# Patient Record
Sex: FEMALE | Race: WHITE | Employment: FULL TIME | URBAN - METROPOLITAN AREA
[De-identification: names, ages, dates, MRNs, and addresses within clinical notes are randomized per-mention and may not be internally consistent; named-entity substitution may affect disease eponyms.]

---

## 2019-08-14 ENCOUNTER — HOSPITAL ENCOUNTER (OUTPATIENT)
Dept: PHYSICAL THERAPY | Age: 49
Discharge: HOME | End: 2019-08-14
Payer: COMMERCIAL

## 2019-08-14 PROCEDURE — 97161 PT EVAL LOW COMPLEX 20 MIN: CPT | Performed by: PHYSICAL THERAPIST

## 2019-08-14 PROCEDURE — 97110 THERAPEUTIC EXERCISES: CPT | Performed by: PHYSICAL THERAPIST

## 2019-08-14 PROCEDURE — 97530 THERAPEUTIC ACTIVITIES: CPT | Performed by: PHYSICAL THERAPIST

## 2019-08-14 PROCEDURE — 97140 MANUAL THERAPY 1/> REGIONS: CPT | Performed by: PHYSICAL THERAPIST

## 2019-08-14 NOTE — PROGRESS NOTES
In [address] [place] [address] Square  [address] [place] [address], [address] [place] [address], [address] [place] [address] 434,[address] 300  [phone] fax      Plan of Care/ Statement of Necessity for Physical Therapy Services    Patient name: Lazarus Meléndez Start of Care: 2019   Referral source: Garett Saenz* : 1970    Medical Diagnosis: Pain in right knee [M25.561]  Payor: [place] / Plan: [address] PT / Product Type: Commerical /  Onset Date:2019    Treatment Diagnosis: R knee pain   Prior Hospitalization: see medical history Provider#: [id number]   Medications: Verified on Patient summary List    Comorbidities: Patient reports: asthma, back pain, BMI >30, headaches, prior surgery, stroke or TIA. Prior Level of Function: Patient reports she was walking 2.5 miles/day 5x/week and doing Weight Watchers, lost 30lbs prior to surgery. The Plan of Care and following information is based on the information from the initial evaluation. Assessment/ key information: Patient is a pleasant middle-aged older adult female with sub-acute onset of R knee pain following arthroscopic meniscectomy following prolonged pain and discomfort. Objective measures and special testing identify expected levels of pain, decreased range of motion and localized edema. Treatment will consist of strengthening and return to functional activities with discussion of lifestyle factors that affect pain and recovery/healing time.    Evaluation Complexity History MEDIUM  Complexity : 1-2 comorbidities / personal factors will impact the outcome/ POC ; Examination LOW Complexity : 1-2 Standardized tests and measures addressing body structure, function, activity limitation and / or participation in recreation  ;Presentation LOW Complexity : Stable, uncomplicated  ;Clinical Decision Making MEDIUM Complexity : FOTO score of 26-74  Overall Complexity Rating: MEDIUM  Problem List: pain affecting function, decrease ROM, decrease strength, edema affecting function, impaired gait/ balance, decrease ADL/ functional abilitiies, decrease activity tolerance, decrease flexibility/ joint mobility and decrease transfer abilities   Treatment Plan may include any combination of the following: Therapeutic exercise, Therapeutic activities, Neuromuscular re-education, Physical agent/modality, Gait/balance training, Manual therapy, Patient education, Self Care training, Functional mobility training, Stair training and Other: home exercise program  Patient / Family readiness to learn indicated by: asking questions, trying to perform skills and interest  Persons(s) to be included in education: patient (P)  Barriers to Learning/Limitations: None  Patient Goal (s): \"I want to regain stability and flexibility of the R knee, get back to walking. \"  Patient Self Reported Health Status: good  Rehabilitation Potential: good  Short Term Goals: To be accomplished in 4 weeks:  1. Patient will decrease pain during aggravating activities by 2 points on Verbal Analog Scale (Eval: 5/10) to increase participation on Activities of Daily Living such as bending and squatting. 2. Patient will demonstrate increased strength by ½ grade on MMT (3+/5) in B LEs to improve functional participation in such tasks as standing and sit/stand transfers. 3. Patient will have little to no difficulty ascending/descending stairs (eval: too low functioning to test)  Long Term Goals: To be accomplished in 10 weeks:  1. Patient will decrease pain during aggravating activities by 4 points on Verbal Analog Scale (Eval: 5/10) to increase participation on Activities of Daily Living such as, squatting, kneeling, lifting light to moderate objects as required by job. 2. Patient will demonstrate increased strength by 1 grade on MMT in B LEs (Eval: 3+/5) to improve functional participation in such tasks as standing and sitting for greater than 30 min.    3. Patient will achieve predicted FOTO scores (59, eval 33) to demonstrate decreased functional impairment to facilitate improved participation in activities of daily living, improve overall quality of life    Frequency / Duration: Patient to be seen 2-3 times per week for 10 weeks for up to 30 visits. Patient/ Caregiver education and instruction: Diagnosis, prognosis, self care, exercises and other home exercise program   [x]  Plan of care has been reviewed with DAYNE Colon, PT 8/14/2019 9:20 AM  ________________________________________________________________________    I certify that the above Therapy Services are being furnished while the patient is under my care. I agree with the treatment plan and certify that this therapy is necessary.     Physician's Signature:____________Date:_________TIME:________    [employer], Date and Time must be completed for valid certification **      Please sign and return to In [address] [place] [address] Drive Square  [address] [place] [address], [address] [place] [address], [id number] 434,[facility] 300 [phone] fax

## 2019-08-14 NOTE — PROGRESS NOTES
PT DAILY TREATMENT NOTE/KNEE EVAL 10-18    Patient Name: Urbano Choe  Date:2019  : 1970  [x]  Patient  Verified  Payor: Ashley Keep / Plan: [facility]  CAPITATED PT / Product Type: Commerical /    In time:9:12A  Out time:9:57A  Total Treatment Time (min): 42min  Visit #: 1 of 30    Medicare/BCBS Only   Total Timed Codes (min):   1:1 Treatment Time:       Treatment Area: Pain in right knee [M25.561]    SUBJECTIVE  Pain Level (0-10 scale): 4/10  []constant []intermittent [x]improving []worsening []no change since onset    Any medication changes, allergies to medications, adverse drug reactions, diagnosis change, or new procedure performed?: [x] No    [] Yes (see summary sheet for update)  Subjective functional status/changes:     PLOF: Was walking 2.5 miles/day 5x/week and doing Weight Watchers, lost 30lbs prior to surgery. Limitations to PLOF: Pain  Mechanism of Injury: Insidous - thinks she may have tripped while walking at a football game  Current symptoms/Complaints: Aggravating factors: 1) Straightening the knee causes 5/10, 2) Lifting leg to put socks/shoes on causes 5-6/10 pain; Eases: 1) Chiropractor for about 2 weeks, 2) Rest  Previous Treatment/Compliance: did have physical therapy  PMHx/Surgical Hx: Patient reports: asthma, back pain, headaches, prior surgery, stroke, or TIA  Work Hx: Works full time as an . Living Situation: Lives in one story home with  with 3 SARIAH and bilateral handrails. Pt Goals: \"I want to regain stability and flexibility of the R knee, get back to walking. \"  Barriers: []pain []financial []time []transportation []other  Motivation: High  Substance use: []Alcohol []Tobacco []other:   FABQ Score: []low [x]elevate  Cognition: A & O x 4    Other:    OBJECTIVE/EXAMINATION  Domestic Life: see above  Activity/Recreational Limitations: none  Mobility: See gait assessment below  Self Care: I with all ADLs    15 min [x]Eval                  []Re-Eval 12 min Therapeutic Exercise:  [x] See flow sheet :   Rationale: increase ROM and increase strength to improve the patients ability to A/ROM and decrease pain with movement. 10 min Therapeutic Activity:  [x]  See flow sheet :   Rationale: increase strength, improve coordination and improve balance  to improve the patients ability to Tolerate basic ADLs and job-related tasks without pain. 10 min Manual Therapy:  Grade 2-3 mobs to patella-femoral joint and tibia-femoral joint. Rationale: decrease pain, increase ROM and increase tissue extensibility to A/ROM and decrease pain with movement. With   [x] TE   [x] TA   [] neuro   [] other: Patient Education: [x] Review HEP    [x] Progressed/Changed HEP based on:   [x] positioning   [] body mechanics   [] transfers   [] heat/ice application    [] other:      Other Objective/Functional Measures: see body chart for vitals and sit/stand test results.     Physical Therapy Evaluation - Knee    Posture: [] Varus    [x] Valgus    [] Recurvatum        [] Tibial Torsion    [] Foot Supination    [x] Foot Pronation    Describe:    Gait:  [] Normal    [x] Abnormal    [] Antalgic    [] NWB    Device:    Describe: R knee does not reach full extension during stance phase, decreased step length    ROM / Strength  [] Unable to assess                  AROM                      PROM                   Strength (1-5)    Left Right Left Right Left Right   Hip Flexion 90 85   3 3-    Extension 5 10        Abduction          Adduction         Knee Flexion 140 120   3+ 3    Extension +10 0   3+ 3   Ankle Plantarflexion 60 60   4- 3+    Dorsiflexion 10 10   3+ 3       Flexibility: [] Unable to assess at this time  Hamstrings:    (L) Tightness= [] WNL   [] Min   [] Mod   [] Severe    (R) Tightness= [] WNL   [] Min   [x] Mod   [] Severe  Quadriceps:    (L) Tightness= [] WNL   [] Min   [] Mod   [] Severe    (R) Tightness= [] WNL   [] Min   [x] Mod   [] Severe  Gastroc:      (L) Tightness= [] WNL   [] Min   [] Mod   [] Severe    (R) Tightness= [] WNL   [] Min   [] Mod   [] Severe  Other:    Palpation:   Neg/Pos  Neg/Pos  Neg/Pos   Joint Line pos Quad tendon  Patellar ligament    Patella pos Fibular head  Pes Anserinus    Tibial tubercle  Hamstring tendons  Infrapatellar fat pad      Optional Tests:  Patellar Positioning (Static)   [x]L [x]R Normal []L []R Lateral   []L []R Remedios Rook      []L []R Medial   []L []R [place]    Patellar Tracking   []L []R Glide (Lat)   [x]L [x]R Tilt (Lat)     []L []R Glide (Med)  []L []R Tilt (Med)      []L []R Tile (Inf)     Patellar Mobility   [x]L []R Hypermobile []L [x]R Hypomobile         Girth Measurements: swelling noted empirically around R knee joint line    Cm at  Cm above joint line   Cm at   Cm below joint line  Cm at joint line   Left        Right             Other tests/comments:  Knee joint specific Special testing not performed second to presence of recent knee surgery. Pain Level (0-10 scale) post treatment: 2-3/10    ASSESSMENT/Changes in Function: Patient is a pleasant middle-aged older adult female with sub-acute onset of R knee pain following arthroscopic meniscectomy following prolonged pain and discomfort. Objective measures and special testing identify expected levels of pain, decreased range of motion and localized edema. Treatment will consist of strengthening and return to functional activities with discussion of lifestyle factors that affect pain and recovery/healing time. Patient will continue to benefit from skilled PT services to modify and progress therapeutic interventions, address functional mobility deficits, address ROM deficits, address strength deficits, analyze and address soft tissue restrictions, analyze and cue movement patterns, analyze and modify body mechanics/ergonomics and assess and modify postural abnormalities to attain remaining goals.      [x]  See Plan of Care  []  See progress note/recertification  []  See Discharge Summary         Progress towards goals / Updated goals:  Short Term Goals: To be accomplished in 4 weeks:  1. Patient will decrease pain during aggravating activities by 2 points on Verbal Analog Scale (Eval: 5/10) to increase participation on Activities of Daily Living such as bending and squatting. 2. Patient will demonstrate increased strength by ½ grade on MMT (3+/5) in B LEs to improve functional participation in such tasks as standing and sit/stand transfers. 3. Patient will have little to no difficulty ascending/descending stairs (eval: too low functioning to test)  Long Term Goals: To be accomplished in 10 weeks:  1. Patient will decrease pain during aggravating activities by 4 points on Verbal Analog Scale (Eval: 5/10) to increase participation on Activities of Daily Living such as, squatting, kneeling, lifting light to moderate objects as required by job. 2. Patient will demonstrate increased strength by 1 grade on MMT in B LEs (Eval: 3+/5) to improve functional participation in such tasks as standing and sitting for greater than 30 min.    3. Patient will achieve predicted FOTO scores (59, eval 33) to demonstrate decreased functional impairment to facilitate improved participation in activities of daily living, improve overall quality of life    PLAN  [x]  Upgrade activities as tolerated     []  Continue plan of care  []  Update interventions per flow sheet       []  Discharge due to:_  []  Other:_      Gregoria Colon, PT 8/14/2019  9:20 AM

## 2019-08-20 ENCOUNTER — HOSPITAL ENCOUNTER (OUTPATIENT)
Dept: PHYSICAL THERAPY | Age: 49
Discharge: HOME | End: 2019-08-20
Payer: COMMERCIAL

## 2019-08-20 PROCEDURE — 97140 MANUAL THERAPY 1/> REGIONS: CPT

## 2019-08-20 PROCEDURE — 97110 THERAPEUTIC EXERCISES: CPT

## 2019-08-20 PROCEDURE — 97016 VASOPNEUMATIC DEVICE THERAPY: CPT

## 2019-08-20 NOTE — PROGRESS NOTES
PT DAILY TREATMENT NOTE 10-18    Patient Name: Chayo Alvarado  Date:2019  : 1970  [x]  Patient  Verified  Payor: Shelley [place]año [facility] / Product Type: Commerical /    In time:7:30  Out time: 8:16  Total Treatment Time (min): 46  Visit #: 2 of 30     Medicare/BCBS Only   Total Timed Codes (min):   1:1 Treatment Time:         Treatment Area: Pain in right knee [M25.561]    SUBJECTIVE  Pain Level (0-10 scale): 3  Any medication changes, allergies to medications, adverse drug reactions, diagnosis change, or new procedure performed?: [x] No    [] Yes (see summary sheet for update)  Subjective functional status/changes:   [] No changes reported  \"  Achy. \"  Pain  Med  Makes  Me  Feel nauseous. \"    OBJECTIVE    Modality rationale: decrease edema, decrease inflammation, decrease pain and increase tissue extensibility to improve the patients ability to perform ADL   Min Type Additional Details    [] Estim:  []Unatt       []IFC  []Premod                        []Other:  []w/ice   []w/heat  Position:  Location:    [] Estim: []Att    []TENS instruct  []NMES                    []Other:  []w/US   []w/ice   []w/heat  Position:  Location:    []  Traction: [] Cervical       []Lumbar                       [] Prone          []Supine                       []Intermittent   []Continuous Lbs:  [] before manual  [] after manual    []  Ultrasound: []Continuous   [] Pulsed                           []1MHz   []3MHz W/cm2:  Location:    []  Iontophoresis with dexamethasone         Location: [] Take home patch   [] In clinic    []  Ice     []  heat  []  Ice massage  []  Laser   []  Anodyne Position:  Location:    []  Laser with stim  []  Other:  Position:  Location:   10 [x]  Vasopneumatic Device Pressure:       [x] lo [] med [] hi   Temperature: [] lo [] med [] hi   [x] Skin assessment post-treatment:  [x]intact []redness- no adverse reaction    []redness - adverse reaction:      min []Eval []Re-Eval       28 min Therapeutic Exercise:  [] See flow sheet :   Rationale: increase ROM and increase strength to improve the patients ability to perform ADL      min Therapeutic Activity:  []  See flow sheet :   Rationale:   to improve the patients ability to       min Neuromuscular Re-education:  []  See flow sheet :   Rationale:   to improve the patients ability to     8 min Manual Therapy:  Patella mob  Passive  Stretch knee F/ext   Rationale: decrease pain, increase ROM, increase tissue extensibility and decrease edema  to perform ADL     min Gait Training:  ___ feet with ___ device on level surfaces with ___ level of assist   Rationale: With   [x] TE   [] TA   [] neuro   [] other: Patient Education: [x] Review HEP    [] Progressed/Changed HEP based on:   [] positioning   [] body mechanics   [] transfers   [] heat/ice application    [] other:      Other Objective/Functional Measures:      Pain Level (0-10 scale) post treatment: 1    ASSESSMENT/Changes in Function: tight  End  Feel knee  Ext. Pt  Experienced  Minimal  Pain with there ex. Patient will continue to benefit from skilled PT services to address functional mobility deficits, address ROM deficits, address strength deficits, analyze and address soft tissue restrictions, analyze and cue movement patterns and instruct in home and community integration to attain remaining goals. [x]  See Plan of Care  []  See progress note/recertification  []  See Discharge Summary         Progress towards goals / Updated goals:  Short Term Goals: To be accomplished in 4 weeks:  1. Patient will decrease pain during aggravating activities by 2 points on Verbal Analog Scale (Eval: 5/10) to increase participation on Activities of Daily Living such as bending and squatting. progressing 8/20/19  2.  Patient will demonstrate increased strength by ½ grade on MMT (3+/5) in B LEs to improve functional participation in such tasks as standing and sit/stand transfers. 3. Patient will have little to no difficulty ascending/descending stairs (eval: too low functioning to test)  Long Term Goals: To be accomplished in 10 weeks:  1. Patient will decrease pain during aggravating activities by 4 points on Verbal Analog Scale (Eval: 5/10) to increase participation on Activities of Daily Living such as, squatting, kneeling, lifting light to moderate objects as required by job. 2. Patient will demonstrate increased strength by 1 grade on MMT in B LEs (Eval: 3+/5) to improve functional participation in such tasks as standing and sitting for greater than 30 min.    3. Patient will achieve predicted FOTO scores (59, eval 33) to demonstrate decreased functional impairment to facilitate improved participation in activities of daily living, improve overall quality of life    PLAN  []  Upgrade activities as tolerated     [x]  Continue plan of care  []  Update interventions per flow sheet       []  Discharge due to:_  []  Other:_      Halina Travis PTA 8/20/2019  7:44 AM    Future Appointments   Date Time Provider Edith Jordan   8/22/2019  5:30 PM Junior Mott PTA [facility]   8/26/2019  7:30 AM Junior Mott PTA [facility]   8/28/2019  7:30 AM Gurdeep Borrero PT [facility]   9/3/2019  7:30 AM Gurdeep Borrero PT [facility]   9/5/2019  8:00 AM Gurdeep Borrero PT [facility]

## 2019-08-22 ENCOUNTER — HOSPITAL ENCOUNTER (OUTPATIENT)
Dept: PHYSICAL THERAPY | Age: 49
Discharge: HOME | End: 2019-08-22
Payer: COMMERCIAL

## 2019-08-22 PROCEDURE — 97016 VASOPNEUMATIC DEVICE THERAPY: CPT

## 2019-08-22 PROCEDURE — 97110 THERAPEUTIC EXERCISES: CPT

## 2019-08-22 NOTE — PROGRESS NOTES
PT DAILY TREATMENT NOTE 10-18    Patient Name: Urbano Choe  Date:2019  : 1970  [x]  Patient  Verified  Payor: Ashley Keep / [facility]: [facility]  [facility] / Product Type: Commerical /    In time:5:29  Out time:6:30  Total Treatment Time (min): 55  Visit #:   3 of 30    Medicare/BCBS Only   Total Timed Codes (min):   1:1 Treatment Time:         Treatment Area: Pain in right knee [M25.561]    SUBJECTIVE  Pain Level (0-10 scale): 3  Any medication changes, allergies to medications, adverse drug reactions, diagnosis change, or new procedure performed?: [x] No    [] Yes (see summary sheet for update)  Subjective functional status/changes:   [] No changes reported  \"Still some pain. \"    OBJECTIVE    Modality rationale: decrease edema, decrease inflammation, decrease pain and increase tissue extensibility to improve the patients ability to perform ADL    Min Type Additional Details    [] Estim:  []Unatt       []IFC  []Premod                        []Other:  []w/ice   []w/heat  Position:  Location:    [] Estim: []Att    []TENS instruct  []NMES                    []Other:  []w/US   []w/ice   []w/heat  Position:  Location:    []  Traction: [] Cervical       []Lumbar                       [] Prone          []Supine                       []Intermittent   []Continuous Lbs:  [] before manual  [] after manual    []  Ultrasound: []Continuous   [] Pulsed                           []1MHz   []3MHz W/cm2:  Location:    []  Iontophoresis with dexamethasone         Location: [] Take home patch   [] In clinic    []  Ice     []  heat  []  Ice massage  []  Laser   []  Anodyne Position:  Location:    []  Laser with stim  []  Other:  Position:  Location:   15 []  Vasopneumatic Device Pressure:       [] lo [] med [] hi   Temperature: [] lo [] med [] hi   [x] Skin assessment post-treatment:  [x]intact []redness- no adverse reaction    []redness - adverse reaction:      min []Eval                  []Re-Eval       35 min Therapeutic Exercise:  [] See flow sheet :   Rationale: increase ROM and increase strength to improve the patients ability to perform ADL      min Therapeutic Activity:  []  See flow sheet :   Rationale: increase ROM and increase strength  to improve the patients ability to perform ADL       min Neuromuscular Re-education:  []  See flow sheet :   Rationale:   to improve the patients ability to     5 min Manual Therapy:  Patella  Mob/gentle overpressure ext   Rationale: decrease pain, increase ROM, increase tissue extensibility and decrease edema  to perform ADL     min Gait Training:  ___ feet with ___ device on level surfaces with ___ level of assist   Rationale: With   [x] TE   [] TA   [] neuro   [] other: Patient Education: [x] Review HEP    [] Progressed/Changed HEP based on:   [] positioning   [] body mechanics   [] transfers   [] heat/ice application    [] other:      Other Objective/Functional Measures: Added  Leg  Press/steps/squats    Pain Level (0-10 scale) post treatment: 2    ASSESSMENT/Changes in Function:  Responded  Fairly  Well  To each there ex. Tight end feel  And  pain  With knee ext. Patient will continue to benefit from skilled PT services to address functional mobility deficits, address ROM deficits, address strength deficits, analyze and address soft tissue restrictions and analyze and cue movement patterns to attain remaining goals. [x]  See Plan of Care  []  See progress note/recertification  []  See Discharge Summary         Progress towards goals / Updated goals:  Short Term Goals: To be accomplished in 4 weeks:  1. Patient will decrease pain during aggravating activities by 2 points on Verbal Analog Scale (Eval: 5/10) to increase participation on Activities of Daily Living such as bending and squatting.  progressing 8/20/19  2.  Patient will demonstrate increased strength by ½ grade on MMT (3+/5) in B LEs to improve functional participation in such tasks as standing and sit/stand transfers. 3. Patient will have little to no difficulty ascending/descending stairs (eval: too low functioning to test)  Long Term Goals: To be accomplished in 10 weeks:  1. Patient will decrease pain during aggravating activities by 4 points on Verbal Analog Scale (Eval: 5/10) to increase participation on Activities of Daily Living such as, squatting, kneeling, lifting light to moderate objects as required by job. 2. Patient will demonstrate increased strength by 1 grade on MMT in B LEs (Eval: 3+/5) to improve functional participation in such tasks as standing and sitting for greater than 30 min.    3. Patient will achieve predicted FOTO scores (59, eval 33) to demonstrate decreased functional impairment to facilitate improved participation in activities of daily living, improve overall quality of life  PLAN  []  Upgrade activities as tolerated     [x]  Continue plan of care  []  Update interventions per flow sheet       []  Discharge due to:_  []  Other:_      Tj Lieberman PTA 8/22/2019  6:05 PM    Future Appointments   Date Time Provider Edith Jordan   8/26/2019  7:30 AM Tami Davison PTA [facility]   8/28/2019  7:30 AM Lorin Mtz PT [facility]   9/3/2019  7:30 AM Lorin Mtz PT [facility]   9/5/2019  8:00 AM Lorin Mtz PT [facility]

## 2019-08-26 ENCOUNTER — HOSPITAL ENCOUNTER (OUTPATIENT)
Dept: PHYSICAL THERAPY | Age: 49
Discharge: HOME | End: 2019-08-26
Payer: COMMERCIAL

## 2019-08-26 PROCEDURE — 97140 MANUAL THERAPY 1/> REGIONS: CPT

## 2019-08-26 PROCEDURE — 97016 VASOPNEUMATIC DEVICE THERAPY: CPT

## 2019-08-26 PROCEDURE — 97110 THERAPEUTIC EXERCISES: CPT

## 2019-08-26 NOTE — PROGRESS NOTES
PT DAILY TREATMENT NOTE 10-18    Patient Name: Pérez Klein  Date:2019  : 1970  [x]  Patient  Verified  Payor: Lashonda Proctor / [facility] / Product Type: Commerical /    In time:7:31  Out time:8:277  Total Treatment Time (min): 51  Visit #: 4 of 30    Medicare/BCBS Only   Total Timed Codes (min):   1:1 Treatment Time:         Treatment Area: Pain in right knee [M25.561]    SUBJECTIVE  Pain Level (0-10 scale): 2  Any medication changes, allergies to medications, adverse drug reactions, diagnosis change, or new procedure performed?: [x] No    [] Yes (see summary sheet for update)  Subjective functional status/changes:   [] No changes reported  \"Mainly  Stiffness. \"    OBJECTIVE    Modality rationale: decrease edema, decrease inflammation, decrease pain and increase tissue extensibility to improve the patients ability to perform ADL    Min Type Additional Details    [] Estim:  []Unatt       []IFC  []Premod                        []Other:  []w/ice   []w/heat  Position:  Location:    [] Estim: []Att    []TENS instruct  []NMES                    []Other:  []w/US   []w/ice   []w/heat  Position:  Location:    []  Traction: [] Cervical       []Lumbar                       [] Prone          []Supine                       []Intermittent   []Continuous Lbs:  [] before manual  [] after manual    []  Ultrasound: []Continuous   [] Pulsed                           []1MHz   []3MHz W/cm2:  Location:    []  Iontophoresis with dexamethasone         Location: [] Take home patch   [] In clinic    []  Ice     []  heat  []  Ice massage  []  Laser   []  Anodyne Position:  Location:    []  Laser with stim  []  Other:  Position:  Location:   10 [x]  Vasopneumatic Device Pressure:       [x] lo [] med [] hi   Temperature: [] lo [] med [] hi   [x] Skin assessment post-treatment:  [x]intact []redness- no adverse reaction    []redness - adverse reaction:      min []Eval                  []Re-Eval       33 min Therapeutic Exercise:  [x] See flow sheet :   Rationale: increase ROM and increase strength to improve the patients ability to perform ADL      min Therapeutic Activity:  []  See flow sheet :   Rationale: increase ROM and increase strength  to improve the patients ability to perform ADL      min Neuromuscular Re-education:  []  See flow sheet :   Rationale:   to improve the patients ability to     8 min Manual Therapy:  Patella  Mob/passive  Stretch  Knee F/E   Rationale: increase tissue extensibility to perform ADL     min Gait Training:  ___ feet with ___ device on level surfaces with ___ level of assist   Rationale: With   [x] TE   [] TA   [] neuro   [] other: Patient Education: [x] Review HEP    [] Progressed/Changed HEP based on:   [] positioning   [] body mechanics   [] transfers   [] heat/ice application    [] other:      Other Objective/Functional Measures:      Pain Level (0-10 scale) post treatment: no#  Given stated  achy    ASSESSMENT/Changes in Function: continues  With tight end feel with ext. TTP with medial  [place]  During manual. sligh tchallenged  With     Patient will continue to benefit from skilled PT services to address functional mobility deficits, address ROM deficits, address strength deficits, analyze and address soft tissue restrictions, analyze and cue movement patterns and instruct in home and community integration to attain remaining goals. [x]  See Plan of Care  []  See progress note/recertification  []  See Discharge Summary         Progress towards goals / Updated goals:  Short Term Goals: To be accomplished in 4 weeks:  1. Patient will decrease pain during aggravating activities by 2 points on Verbal Analog Scale (Eval: 5/10) to increase participation on Activities of Daily Living such as bending and squatting.  progressing 8/20/19  2.  Patient will demonstrate increased strength by ½ grade on MMT (3+/5) in B LEs to improve functional participation in such tasks as standing and sit/stand transfers. 3. Patient will have little to no difficulty ascending/descending stairs (eval: too low functioning to test)  Long Term Goals: To be accomplished in 10 weeks:  1. Patient will decrease pain during aggravating activities by 4 points on Verbal Analog Scale (Eval: 5/10) to increase participation on Activities of Daily Living such as, squatting, kneeling, lifting light to moderate objects as required by job. 2. Patient will demonstrate increased strength by 1 grade on MMT in B LEs (Eval: 3+/5) to improve functional participation in such tasks as standing and sitting for greater than 30 min.    3. Patient will achieve predicted FOTO scores (59, eval 33) to demonstrate decreased functional impairment to facilitate improved participation in activities of daily living, improve overall quality of life    PLAN  []  Upgrade activities as tolerated     [x]  Continue plan of care  []  Update interventions per flow sheet       []  Discharge due to:_  []  Other:_      Kaya Merino, PTA 8/26/2019  7:42 AM    Future Appointments   Date Time Provider Edith Jordan   8/28/2019  7:30 AM Kalina Castillo PT [facility]   9/3/2019  7:30 AM Kalina Castillo PT [facility]   9/5/2019  8:00 AM Kalina Castillo PT [facility]

## 2019-08-28 ENCOUNTER — HOSPITAL ENCOUNTER (OUTPATIENT)
Dept: PHYSICAL THERAPY | Age: 49
Discharge: HOME | End: 2019-08-28
Payer: COMMERCIAL

## 2019-08-28 PROCEDURE — 97530 THERAPEUTIC ACTIVITIES: CPT | Performed by: PHYSICAL THERAPIST

## 2019-08-28 PROCEDURE — 97110 THERAPEUTIC EXERCISES: CPT | Performed by: PHYSICAL THERAPIST

## 2019-08-28 PROCEDURE — 97112 NEUROMUSCULAR REEDUCATION: CPT | Performed by: PHYSICAL THERAPIST

## 2019-08-28 PROCEDURE — 97140 MANUAL THERAPY 1/> REGIONS: CPT | Performed by: PHYSICAL THERAPIST

## 2019-08-28 PROCEDURE — 97016 VASOPNEUMATIC DEVICE THERAPY: CPT | Performed by: PHYSICAL THERAPIST

## 2019-08-28 NOTE — PROGRESS NOTES
PT DAILY TREATMENT NOTE 10-18    Patient Name: Wing Steiner  Date:2019  : 1970  [x]  Patient  Verified  Payor: Yuliana Overall / Plan: [facility]  [employer] [facility] / Product Type: Commerical /    In time:7:31A  Out time:8:30A  Total Treatment Time (min): 59min  Visit #: 5 of 30    Medicare/BCBS Only   Total Timed Codes (min):   1:1 Treatment Time:         Treatment Area: Pain in right knee [M25.561]    SUBJECTIVE  Pain Level (0-10 scale): 5/10  Any medication changes, allergies to medications, adverse drug reactions, diagnosis change, or new procedure performed?: [x] No    [] Yes (see summary sheet for update)  Subjective functional status/changes:   [] No changes reported  Patient is progressing towards goals of increased activity tolerance but continues to struggle to get up and walk when been sitting for a while. A bit down emotionally because it still hurts. OBJECTIVE    Modality rationale: decrease edema and decrease inflammation to improve the patients ability to improve activity tolerance.     Min Type Additional Details    [] Estim:  []Unatt       []IFC  []Premod                        []Other:  []w/ice   []w/heat  Position:  Location:    [] Estim: []Att    []TENS instruct  []NMES                    []Other:  []w/US   []w/ice   []w/heat  Position:  Location:    []  Traction: [] Cervical       []Lumbar                       [] Prone          []Supine                       []Intermittent   []Continuous Lbs:  [] before manual  [] after manual    []  Ultrasound: []Continuous   [] Pulsed                           []1MHz   []3MHz W/cm2:  Location:    []  Iontophoresis with dexamethasone         Location: [] Take home patch   [] In clinic    []  Ice     []  heat  []  Ice massage  []  Laser   []  Anodyne Position:  Location:    []  Laser with stim  []  Other:  Position:  Location:   15 [x]  Vasopneumatic Device Pressure:       [] lo [x] med [] hi   Temperature: [x] lo [] med [] hi   [] Skin assessment post-treatment:  []intact []redness- no adverse reaction    []redness - adverse reaction:     10 min Therapeutic Exercise:  [x] See flow sheet :   Rationale: increase ROM and increase strength to improve the patients ability to A/ROM and decrease pain with movement. 15 min Therapeutic Activity:  [x]  See flow sheet :   Rationale: increase strength and improve coordination  to improve the patients ability to Tolerate basic ADLs and job-related tasks without pain. 10 min Neuromuscular Re-education:  [x]  See flow sheet :   Rationale: improve coordination, improve balance and increase proprioception  to improve the patients ability to improve ability to trust and weight bear on the R LE.     9 min Manual Therapy:  Grade 2-3 mobs to patella-femoral joint, tibia-femoral and tibia-fibular joint, with patient self-direction. Rationale: decrease pain, increase ROM and increase tissue extensibility to improve activity tolerance. With   [x] TE   [x] TA   [x] neuro   [] other: Patient Education: [x] Review HEP    [x] Progressed/Changed HEP based on:   [x] positioning   [] body mechanics   [] transfers   [] heat/ice application    [] other:      Other Objective/Functional Measures: A/ROM knee extension on R: 0 degs in hamstring stretch position. Pain Level (0-10 scale) post treatment: achy    ASSESSMENT/Changes in Function: Patient is progressing towards goals of increased activity tolerance. Problem-solved way to adjust environment in positive way at work and at home. Encouraged her to start walking the treadmill 5 min every night for exercise.      Patient will continue to benefit from skilled PT services to modify and progress therapeutic interventions, address functional mobility deficits, address ROM deficits, address strength deficits, analyze and address soft tissue restrictions, analyze and cue movement patterns, analyze and modify body mechanics/ergonomics and assess and modify postural abnormalities to attain remaining goals. [x]  See Plan of Care  []  See progress note/recertification  []  See Discharge Summary         Progress towards goals / Updated goals:  Short Term Goals: To be accomplished in 4 weeks:  1. Patient will decrease pain during aggravating activities by 2 points on Verbal Analog Scale (Eval: 5/10) to increase participation on Activities of Daily Living such as bending and squatting.  progressing 8/20/19, 2/10 on 8/28/19  2. Patient will demonstrate increased strength by ½ grade on MMT (3+/5) in B LEs to improve functional participation in such tasks as standing and sit/stand transfers. 4-/5 on 8/28/19  3. Patient will have little to no difficulty ascending/descending stairs (eval: too low functioning to test); some difficulty on 8/28/19  Long Term Goals: To be accomplished in 10 weeks:  1. Patient will decrease pain during aggravating activities by 4 points on Verbal Analog Scale (Eval: 5/10) to increase participation on Activities of Daily Living such as, squatting, kneeling, lifting light to moderate objects as required by job. 2. Patient will demonstrate increased strength by 1 grade on MMT in B LEs (Eval: 3+/5) to improve functional participation in such tasks as standing and sitting for greater than 30 min.    3. Patient will achieve predicted FOTO scores (59, eval 33) to demonstrate decreased functional impairment to facilitate improved participation in activities of daily living, improve overall quality of life    PLAN  [x]  Upgrade activities as tolerated     []  Continue plan of care  []  Update interventions per flow sheet       []  Discharge due to:_  []  Other:_      Abbey Navas PT 8/28/2019  8:22 AM    Future Appointments   Date Time Provider Edith Jordan   9/3/2019  7:30 AM Dmitri Montaño PT [facility]   9/5/2019  8:00 AM [facility]

## 2019-09-03 ENCOUNTER — APPOINTMENT (OUTPATIENT)
Dept: PHYSICAL THERAPY | Age: 49
End: 2019-09-03
Payer: COMMERCIAL

## 2019-09-05 ENCOUNTER — HOSPITAL ENCOUNTER (OUTPATIENT)
Dept: PHYSICAL THERAPY | Age: 49
Discharge: HOME | End: 2019-09-05
Payer: COMMERCIAL

## 2019-09-05 PROCEDURE — 97112 NEUROMUSCULAR REEDUCATION: CPT

## 2019-09-05 PROCEDURE — 97110 THERAPEUTIC EXERCISES: CPT

## 2019-09-05 PROCEDURE — 97016 VASOPNEUMATIC DEVICE THERAPY: CPT

## 2019-09-05 PROCEDURE — 97530 THERAPEUTIC ACTIVITIES: CPT

## 2019-09-05 PROCEDURE — 97032 APPL MODALITY 1+ESTIM EA 15: CPT

## 2019-09-05 NOTE — PROGRESS NOTES
PT DAILY TREATMENT NOTE 10-18    Patient Name: Sade Collado  Date:2019  : 1970  [x]  Patient  Verified  Payor: Antonia Ruiz / Plan: [address] PT / Product Type: Commerical /    In time:800  Out time:853  Total Treatment Time (min): 50  Visit #: 6 of 30    Medicare/BCBS Only   Total Timed Codes (min):   1:1 Treatment Time:         Treatment Area: Pain in right knee [M25.561]    SUBJECTIVE  Pain Level (0-10 scale): 2  Any medication changes, allergies to medications, adverse drug reactions, diagnosis change, or new procedure performed?: [x] No    [] Yes (see summary sheet for update)  Subjective functional status/changes:   [] No changes reported  \"it's about a 2 today. \"      OBJECTIVE    Modality rationale: decrease edema, decrease inflammation, decrease pain and increase tissue extensibility to improve the patients ability to aid with increase tolerance to ADLs and activities   Min Type Additional Details    [] Estim:  []Unatt       []IFC  []Premod                        []Other:  []w/ice   []w/heat  Position:  Location:   8 [x] Estim: [x]Att    []TENS instruct  []NMES                    [x]Other: LTO []w/US   []w/ice   []w/heat  Position:reclined  Location:right distal ITBand and lateral knee    []  Traction: [] Cervical       []Lumbar                       [] Prone          []Supine                       []Intermittent   []Continuous Lbs:  [] before manual  [] after manual    []  Ultrasound: []Continuous   [] Pulsed                           []1MHz   []3MHz W/cm2:  Location:    []  Iontophoresis with dexamethasone         Location: [] Take home patch   [] In clinic    []  Ice     []  heat  []  Ice massage  []  Laser   []  Anodyne Position:  Location:    []  Laser with stim  []  Other:  Position:  Location:   15 [x]  Vasopneumatic Device  Right knee Pressure:       [x] lo [] med [] hi   Temperature: [x] lo [] med [] hi   [] Skin assessment post-treatment:  []intact []redness- no adverse reaction    []redness - adverse reaction:       min []Eval                  []Re-Eval       10 min Therapeutic Exercise:  [] See flow sheet :   Rationale: increase ROM, increase strength and improve coordination to improve the patients ability to aid with increase tolerance to ADLs and activities    8 min Therapeutic Activity:  []  See flow sheet :   Rationale: increase ROM, increase strength and improve coordination  to improve the patients ability to aid with increase tolerance to ADLS and activities     9 min Neuromuscular Re-education:  []  See flow sheet :   Rationale: increase ROM, increase strength and improve coordination  to improve the patients ability to aid with increase tolerance to ADLs and activities     min Manual Therapy:     Rationale:  to      min Gait Training:  ___ feet with ___ device on level surfaces with ___ level of assist   Rationale: With   [x] TE   [x] TA   [] neuro   [] other: Patient Education: [x] Review HEP    [x] Progressed/Changed HEP based on:   [] positioning   [] body mechanics   [] transfers   [] heat/ice application    [x] other: LTO     Other Objective/Functional Measures: VC exercises and tech      Pain Level (0-10 scale) post treatment: <2    ASSESSMENT/Changes in Function: tolerated well. Point tender lateral knee distal ITBand    Patient will continue to benefit from skilled PT services to modify and progress therapeutic interventions, address functional mobility deficits, address ROM deficits, address strength deficits, analyze and address soft tissue restrictions, analyze and cue movement patterns, analyze and modify body mechanics/ergonomics, assess and modify postural abnormalities and instruct in home and community integration to attain remaining goals. [x]  See Plan of Care  []  See progress note/recertification  []  See Discharge Summary         Progress towards goals / Updated goals:   Short Term Goals: To be accomplished in 4 weeks:  1.  Patient will decrease pain during aggravating activities by 2 points on Verbal Analog Scale (Eval: 5/10) to increase participation on Activities of Daily Living such as bending and squatting.  progressing 8/20/19, 2/10 on 8/28/19   2 9/5/19  2. Patient will demonstrate increased strength by ½ grade on MMT (3+/5) in B LEs to improve functional participation in such tasks as standing and sit/stand transfers. 4-/5 on 8/28/19  3. Patient will have little to no difficulty ascending/descending stairs (eval: too low functioning to test); some difficulty on 8/28/19  Long Term Goals: To be accomplished in 10 weeks:  1. Patient will decrease pain during aggravating activities by 4 points on Verbal Analog Scale (Eval: 5/10) to increase participation on Activities of Daily Living such as, squatting, kneeling, lifting light to moderate objects as required by job. CURRENT  9/5/19  2. Patient will demonstrate increased strength by 1 grade on MMT in B LEs (Eval: 3+/5) to improve functional participation in such tasks as standing and sitting for greater than 30 min.    Patient will achieve predicted FOTO scores (59, eval 33) to demonstrate decreased functional impairment to facilitate improved participation in activities of daily living, improve overall quality of life    PLAN  [x]  Upgrade activities as tolerated     [x]  Continue plan of care  []  Update interventions per flow sheet       []  Discharge due to:_  []  Other:_      Lasha Lal PT 9/5/2019  8:03 AM    Future Appointments   Date Time Provider Edith Jordan   9/9/2019  8:00 AM Mehdi Hernandez PTA [facility]   9/11/2019  7:30 AM Edvin Gearing, PT [facility]   9/13/2019  7:30 AM Mehdi Hernandez PTA [facility]   9/17/2019  7:30 AM Edvin Gearing, PT [facility]   9/20/2019  7:30 AM Edvin Gearing, PT [facility]

## 2019-09-09 ENCOUNTER — APPOINTMENT (OUTPATIENT)
Dept: PHYSICAL THERAPY | Age: 49
End: 2019-09-09
Payer: COMMERCIAL

## 2019-09-11 ENCOUNTER — APPOINTMENT (OUTPATIENT)
Dept: PHYSICAL THERAPY | Age: 49
End: 2019-09-11
Payer: COMMERCIAL

## 2019-09-13 ENCOUNTER — HOSPITAL ENCOUNTER (OUTPATIENT)
Dept: PHYSICAL THERAPY | Age: 49
Discharge: HOME | End: 2019-09-13
Payer: COMMERCIAL

## 2019-09-13 PROCEDURE — 97032 APPL MODALITY 1+ESTIM EA 15: CPT

## 2019-09-13 PROCEDURE — 97016 VASOPNEUMATIC DEVICE THERAPY: CPT

## 2019-09-13 PROCEDURE — 97110 THERAPEUTIC EXERCISES: CPT

## 2019-09-13 NOTE — PROGRESS NOTES
PT DAILY TREATMENT NOTE 10-18    Patient Name: Haroldo Gracia  Date:2019  : 1970  [x]  Patient  Verified  Payor: Herbert Sit / Plan: [address] [place] [address] Rd PT / Product Type: Commerical /    In time: 7:34 Out time: 8:26  Total Treatment Time (min): 44  Visit #:7 of 30    Medicare/BCBS Only   Total Timed Codes (min):  1:1 Treatment Time:         Treatment Area: Pain in right knee [M25.561]    SUBJECTIVE  Pain Level (0-10 scale): 2  Any medication changes, allergies to medications, adverse drug reactions, diagnosis change, or new procedure performed?: [x] No    [] Yes (see summary sheet for update)  Subjective functional status/changes:   [] No changes reported  \"Its  About the same. \"    OBJECTIVE    Modality rationale: decrease edema, decrease inflammation, decrease pain and increase tissue extensibility to improve the patients ability to perform ADL   Min Type Additional Details    [] Estim:  []Unatt       []IFC  []Premod                        []Other:  []w/ice   []w/heat  Position:  Location:   8 [x] Estim: [x]Att    []TENS instruct  []NMES                    []Other: LTO []w/US   []w/ice   []w/heat  Position:  Location:    []  Traction: [] Cervical       []Lumbar                       [] Prone          []Supine                       []Intermittent   []Continuous Lbs:  [] before manual  [] after manual    []  Ultrasound: []Continuous   [] Pulsed                           []1MHz   []3MHz W/cm2:  Location:    []  Iontophoresis with dexamethasone         Location: [] Take home patch   [] In clinic    []  Ice     []  heat  []  Ice massage  []  Laser   []  Anodyne Position:  Location:    []  Laser with stim  []  Other:  Position:  Location:   10 [x]  Vasopneumatic Device Pressure:       [x] lo [] med [] hi   Temperature: [] lo [] med [] hi   [x] Skin assessment post-treatment:  [x]intact []redness- no adverse reaction    []redness - adverse reaction:      min []Eval                  []Re-Eval       26 min Therapeutic Exercise:  [x] See flow sheet :   Rationale: increase ROM and increase strength to improve the patients ability to perform ADL     min Therapeutic Activity:  []  See flow sheet :   Rationale: increase ROM and increase strength  to improve the patients ability to perform ADL       min Neuromuscular Re-education:  []  See flow sheet :   Rationale:   to improve the patients ability to      min Manual Therapy:     Rationale: decrease pain, increase ROM, increase tissue extensibility and decrease edema  to perform ADL     min Gait Training:  ___ feet with ___ device on level surfaces with ___ level of assist   Rationale: With   [x] TE   [] TA   [] neuro   [] other: Patient Education: [x] Review HEP    [] Progressed/Changed HEP based on:   [] positioning   [] body mechanics   [] transfers   [] heat/ice application    [] other:      Other Objective/Functional Measures:      Pain Level (0-10 scale) post treatment: 1    ASSESSMENT/Changes in Function: Tender at lateral  And  Medial  Knee. Experienced min increased pain with stand HR/TR. Overall fair  Response  To each there ex. Patient will continue to benefit from skilled PT services to address functional mobility deficits, address ROM deficits, address strength deficits, analyze and address soft tissue restrictions and analyze and cue movement patterns to attain remaining goals. [x]  See Plan of Care  []  See progress note/recertification  []  See Discharge Summary         Progress towards goals / Updated goals:  Short Term Goals: To be accomplished in 4 weeks:  1. Patient will decrease pain during aggravating activities by 2 points on Verbal Analog Scale (Eval: 5/10) to increase participation on Activities of Daily Living such as bending and squatting.  progressing 8/20/19, 2/10 on 8/28/19   2 9/5/19  2.  Patient will demonstrate increased strength by ½ grade on MMT (3+/5) in B LEs to improve functional participation in such tasks as standing and sit/stand transfers. 4-/5 on 8/28/19  3. Patient will have little to no difficulty ascending/descending stairs (eval: too low functioning to test); some difficulty on 8/28/19  Long Term Goals: To be accomplished in 10 weeks:  1. Patient will decrease pain during aggravating activities by 4 points on Verbal Analog Scale (Eval: 5/10) to increase participation on Activities of Daily Living such as, squatting, kneeling, lifting light to moderate objects as required by job. CURRENT  9/5/19  2. Patient will demonstrate increased strength by 1 grade on MMT in B LEs (Eval: 3+/5) to improve functional participation in such tasks as standing and sitting for greater than 30 min.    Patient will achieve predicted FOTO scores (59, eval 33) to demonstrate decreased functional impairment to facilitate improved participation in activities of daily living, improve overall quality of life    PLAN  []  Upgrade activities as tolerated     [x]  Continue plan of care  []  Update interventions per flow sheet       []  Discharge due to:_  [x]  Other:_  REASSESS GOALS for  MD NOTE NV    [address] [facility] 9/13/2019  7:41 AM    Future Appointments   Date Time Provider Edith Jordan   9/17/2019  7:30 AM Varsha Barron PT [facility]   9/20/2019  7:30 AM Varsha Barron PT [facility]

## 2019-09-17 ENCOUNTER — HOSPITAL ENCOUNTER (OUTPATIENT)
Dept: PHYSICAL THERAPY | Age: 49
Discharge: HOME | End: 2019-09-17
Payer: COMMERCIAL

## 2019-09-17 PROCEDURE — 97016 VASOPNEUMATIC DEVICE THERAPY: CPT

## 2019-09-17 PROCEDURE — 97032 APPL MODALITY 1+ESTIM EA 15: CPT

## 2019-09-17 PROCEDURE — 97110 THERAPEUTIC EXERCISES: CPT

## 2019-09-17 PROCEDURE — 97112 NEUROMUSCULAR REEDUCATION: CPT

## 2019-09-17 PROCEDURE — 97530 THERAPEUTIC ACTIVITIES: CPT

## 2019-09-17 NOTE — PROGRESS NOTES
PT DAILY TREATMENT NOTE 10-18    Patient Name: Yareli Villar  Date:2019  : 1970  [x]  Patient  Verified  Payor: Deandre Mention / Plan: [address] PT / Product Type: Commerical /    In time:728  Out time:832  Total Treatment Time (min): 61  Visit #: 8 of 30    Medicare/BCBS Only   Total Timed Codes (min):   1:1 Treatment Time:         Treatment Area: Pain in right knee [M25.561]    SUBJECTIVE  Pain Level (0-10 scale): 2  Any medication changes, allergies to medications, adverse drug reactions, diagnosis change, or new procedure performed?: [x] No    [] Yes (see summary sheet for update)  Subjective functional status/changes:   [] No changes reported  \" I saw the doctor last week and they gave me a paper to continue.  I go back in 1 month\"     OBJECTIVE    Modality rationale: decrease pain and increase tissue extensibility to improve the patients ability to tolerat   Min Type Additional Details    [] Estim:  []Unatt       []IFC  []Premod                        []Other:  []w/ice   []w/heat  Position:  Location:   8 [x] Estim: [x]Att    []TENS instruct  []NMES                    [x]Other: LTO []w/US   []w/ice   []w/heat  Position:reclined  Location:right lateral knee    []  Traction: [] Cervical       []Lumbar                       [] Prone          []Supine                       []Intermittent   []Continuous Lbs:  [] before manual  [] after manual    []  Ultrasound: []Continuous   [] Pulsed                           []1MHz   []3MHz W/cm2:  Location:    []  Iontophoresis with dexamethasone         Location: [] Take home patch   [] In clinic    []  Ice     []  heat  []  Ice massage  []  Laser   []  Anodyne Position:  Location:    []  Laser with stim  []  Other:  Position:  Location:   15 [x]  Vasopneumatic Device     Right knee Pressure:       [x] lo [] med [] hi   Temperature: [x] lo [] med [] hi   [] Skin assessment post-treatment:  []intact []redness- no adverse reaction    []redness - adverse reaction:       min []Eval                  []Re-Eval       15 min Therapeutic Exercise:  [x] See flow sheet :   Rationale: increase ROM, increase strength and improve coordination to improve the patients ability to tolerate ADLS and activities    10 min Therapeutic Activity:  [x]  See flow sheet :   Rationale: increase ROM, increase strength, improve coordination and improve balance  to improve the patients ability to tolerate ADLS and activities     13 min Neuromuscular Re-education:  [x]  See flow sheet :   Rationale: increase ROM, increase strength and improve coordination  to improve the patients ability to tolerate ADLs and activities      min Manual Therapy:     Rationale:  to      min Gait Training:  ___ feet with ___ device on level surfaces with ___ level of assist   Rationale: With   [] TE   [] TA   [] neuro   [] other: Patient Education: [x] Review HEP    [] Progressed/Changed HEP based on:   [] positioning   [] body mechanics   [] transfers   [] heat/ice application    [] other:      Other Objective/Functional Measures:  VC exercises and technique    Pain Level (0-10 scale) post treatment: 2    ASSESSMENT/Changes in Function: tolerated well. Patient will continue to benefit from skilled PT services to modify and progress therapeutic interventions, address ROM deficits, address strength deficits, analyze and address soft tissue restrictions, analyze and cue movement patterns, analyze and modify body mechanics/ergonomics, assess and modify postural abnormalities and instruct in home and community integration to attain remaining goals. [x]  See Plan of Care  [x]  See progress note/recertification  []  See Discharge Summary         Progress towards goals / Updated goals:   Short Term Goals: To be accomplished in 4 weeks:  1.  Patient will decrease pain during aggravating activities by 2 points on Verbal Analog Scale (Eval: 5/10) to increase participation on Activities of Daily Living such as bending and squatting. CURRENT pain with activities is 2-3 9/17/19  2. Patient will demonstrate increased strength by ½ grade on MMT (3+/5) in B LEs to improve functional participation in such tasks as standing and sit/stand transfers. 4-/5 on 8/28/19  3. Patient will have little to no difficulty ascending/descending stairs (eval: too low functioning to test);CURRENT still needs use of 1 rail with stairs overall improving (slowly) 9/17/19  Long Term Goals: To be accomplished in 10 weeks:  1. Patient will decrease pain during aggravating activities by 4 points on Verbal Analog Scale (Eval: 5/10) to increase participation on Activities of Daily Living such as, squatting, kneeling, lifting light to moderate objects as required by job.     CURRENT 2-3 9/17/19  2. Patient will demonstrate increased strength by 1 grade on MMT in B LEs (Eval: 3+/5) to improve functional participation in such tasks as standing and sitting for greater than 30 min.    3. Patient will achieve predicted FOTO scores (59, eval 33) to demonstrate decreased functional impairment to facilitate improved participation in activities of daily living, improve overall quality of life CURRENT        PLAN  [x]  Upgrade activities as tolerated     [x]  Continue plan of care  []  Update interventions per flow sheet       []  Discharge due to:_  [x]  Other:_  Send MD note  Stevenson Eaton PT 9/17/2019  7:37 AM    Future Appointments   Date Time Provider Edith Jordan   9/20/2019  7:30 AM Manjula Hernandez PT [facility]

## 2019-09-17 NOTE — PROGRESS NOTES
[facility] [address] [facility]  [address] [facility], [facility] [facility], [facility] [address],[place] [address]  [phone] fax    Physician Update  [x] Progress Note  [] Discharge Summary  Patient name: Mariza Bonilla Start of Care: 19   Referral source: Noel Knife* : 1970   Medical/Treatment Diagnosis: Pain in right knee [M25.561]  Payor: Lennox Perez / Plan: [address] PT / Product Type: Commerical /  Onset Date:19     Prior Hospitalization: see medical history Provider#: [id number]   Medications: Verified on Patient Summary List     Comorbidities: Patient reports: asthma, back pain, BMI >30, headaches, prior surgery, stroke or TIA. Prior Level of Function: Patient reports she was walking 2.5 miles/day 5x/week and doing Weight Watchers, lost 30lbs prior to surgery.     Visits from Start of Care: 8    Missed Visits: 1    Status at Evaluation/Last Progress Note: initial evaluation  Progress towards Goals: Pian average with activiites is 2-3. FOTO improved to 43. She has exercises for her HEP and reports compliance. Overall she is noting improvement however not as quickly as she would like to. Goals: to be achieved in 30 total treatments:  1. Patient will decrease pain during aggravating activities by 4 points on Verbal Analog Scale (Eval: 5/10) to increase participation on Activities of Daily Living such as, squatting, kneeling, lifting light to moderate objects as required by job.     CURRENT 2-3 19  2. Patient will demonstrate increased strength by 1 grade on MMT in B LEs (Eval: 3+/5) to improve functional participation in such tasks as standing and sitting for greater than 30 min.            3. Patient will achieve predicted FOTO scores (59, eval 33) to demonstrate decreased functional impairment to facilitate improved participation in activities of daily living, improve overall quality of life CURRENT 43   4 patient will tolerate TM 1/4 mile with no significant increase pain to aid with increase tolerance to ADLs and activities at home   CURRENT . 16 miles in 6 minutes  ASSESSMENT/RECOMMENDATIONS:  [x]Continue therapy per initial plan/protocol at a frequency of  2 x per week for 30 total treatments/ reassess in 30 days  []Continue therapy with the following recommended changes:_____________________      _____________________________________________________________________  []Discontinue therapy progressing towards or have reached established goals  []Discontinue therapy due to lack of appreciable progress towards goals  []Discontinue therapy due to lack of attendance or compliance  []Await Physician's recommendations/decisions regarding therapy  []Other:________________________________________________________________    Thank you for this referral. Anthony Salazar, PT 9/17/2019 8:02 AM  NOTE TO PHYSICIAN:  PLEASE COMPLETE THE ORDERS BELOW AND   FAX TO [facility]: [phone]  If you are unable to process this request in 24 hours please contact our office: [phone]    ? I have read the above report and request that my patient continue as recommended. ? I have read the above report and request that my patient continue therapy with the following changes/special instructions:_____________________________________  ? I have read the above report and request that my patient be discharged from therapy.     [de-identified] Signature:____________Date:_________TIME:________    [employer], Date and Time must be completed for valid certification **

## 2019-09-20 ENCOUNTER — HOSPITAL ENCOUNTER (OUTPATIENT)
Dept: PHYSICAL THERAPY | Age: 49
Discharge: HOME | End: 2019-09-20
Payer: COMMERCIAL

## 2019-09-20 PROCEDURE — 97110 THERAPEUTIC EXERCISES: CPT

## 2019-09-20 PROCEDURE — 97016 VASOPNEUMATIC DEVICE THERAPY: CPT

## 2019-09-20 PROCEDURE — 97112 NEUROMUSCULAR REEDUCATION: CPT

## 2019-09-20 PROCEDURE — 97530 THERAPEUTIC ACTIVITIES: CPT

## 2019-09-20 PROCEDURE — 97032 APPL MODALITY 1+ESTIM EA 15: CPT

## 2019-09-20 NOTE — PROGRESS NOTES
PT DAILY TREATMENT NOTE 10-18    Patient Name: Dax Jaimes  Date:2019  : 1970  [x]  Patient  Verified  Payor: Tong Augustine / Plan: [facility] [employer] [facility] / Product Type: Commerical /    In time:731  Out time:833  Total Treatment Time (min): 58  Visit #: 9 of 30    Medicare/BCBS Only   Total Timed Codes (min):          Treatment Area: Pain in right knee [M25.561]    SUBJECTIVE  Pain Level (0-10 scale): 2  Any medication changes, allergies to medications, adverse drug reactions, diagnosis change, or new procedure performed?: [x] No    [] Yes (see summary sheet for update)  Subjective functional status/changes:   [] No changes reported  \" It is still a little sore. The laser helps. \"    OBJECTIVE    Modality rationale: decrease inflammation, decrease pain and increase tissue extensibility to improve the patients ability to tolerate ADLS and activities   Min Type Additional Details    [] Estim:  []Unatt       []IFC  []Premod                        []Other:  []w/ice   []w/heat  Position:  Location:   8 [x] Estim: [x]Att    []TENS instruct  []NMES                    [x]Other: LTO  []w/US   []w/ice   []w/heat  Position:reclined  Location:;lateral > medial right knee    []  Traction: [] Cervical       []Lumbar                       [] Prone          []Supine                       []Intermittent   []Continuous Lbs:  [] before manual  [] after manual    []  Ultrasound: []Continuous   [] Pulsed                           []1MHz   []3MHz W/cm2:  Location:    []  Iontophoresis with dexamethasone         Location: [] Take home patch   [] In clinic    []  Ice     []  heat  []  Ice massage  []  Laser   []  Anodyne Position:  Location:    []  Laser with stim  []  Other:  Position:  Location:   15 [x]  Vasopneumatic Device  Right knee Pressure:       [] lo [x] med [] hi   Temperature: [x] lo [] med [] hi   [] Skin assessment post-treatment:  []intact []redness- no adverse reaction    []redness - adverse reaction: min []Eval                  []Re-Eval       15 min Therapeutic Exercise:  [x] See flow sheet :   Rationale: increase ROM, increase strength and improve coordination to improve the patients ability to tolerate ADLs and activities    10 min Therapeutic Activity:  [x]  See flow sheet :   Rationale: increase ROM, increase strength and improve coordination  to improve the patients ability to tolerate ADLS and activities     10 min Neuromuscular Re-education:  [x]  See flow sheet :   Rationale: increase ROM, increase strength, improve coordination, improve balance and increase proprioception  to improve the patients ability to tolerate ADLS and activities     min Manual Therapy:     Rationale:  to      min Gait Training:  ___ feet with ___ device on level surfaces with ___ level of assist   Rationale: With   [x] TE   [x] TA   [] neuro   [] other: Patient Education: [x] Review HEP    [x] Progressed/Changed HEP based on:   [] positioning   [] body mechanics   [] transfers   [] heat/ice application    [] other:      Other Objective/Functional Measures: VC exercises and tech. Pain Level (0-10 scale) post treatment: <2    ASSESSMENT/Changes in Function: tolerated well    Patient will continue to benefit from skilled PT services to modify and progress therapeutic interventions, address ROM deficits, address strength deficits, analyze and address soft tissue restrictions, analyze and cue movement patterns, analyze and modify body mechanics/ergonomics, assess and modify postural abnormalities and instruct in home and community integration to attain remaining goals. [x]  See Plan of Care  [x]  See progress note/recertification  []  See Discharge Summary         Progress towards goals / Updated goals:  Goals: to be achieved in 30 total treatments:  1.  Patient will decrease pain during aggravating activities by 4 points on Verbal Analog Scale (Eval: 5/10) to increase participation on Activities of Daily Living such as, squatting, kneeling, lifting light to moderate objects as required by job.     CURRENT 2-3 9/17/19 9/20/19  2. Patient will demonstrate increased strength by 1 grade on MMT in B LEs (Eval: 3+/5) to improve functional participation in such tasks as standing and sitting for greater than 30 min.   CURRENT ongoing 9/20/19          3. Patient will achieve predicted FOTO scores (59, eval 33) to demonstrate decreased functional impairment to facilitate improved participation in activities of daily living, improve overall quality of life CURRENT 43              4 patient will tolerate TM 1/4 mile with no significant increase pain to aid with increase tolerance to ADLs and activities at home              CURRENT . 16 miles in 6 minutes  , 7 minutes 9/20/19    PLAN  [x]  Upgrade activities as tolerated     [x]  Continue plan of care  []  Update interventions per flow sheet       []  Discharge due to:_  []  Other:_      Chan Kincaid, PT 9/20/2019  7:36 AM    Future Appointments   Date Time Provider Edith Jordan   9/25/2019  8:00 AM Faviola Sky PTA [facility]   9/30/2019  7:30 AM Faviola Sky PTA [facility]   10/2/2019  7:30 AM Jun Everett PT [facility]   10/4/2019  4:00 PM Faviola Sky PTA [facility]   10/9/2019  7:30 AM Rajni Haywood PTA [facility]   10/11/2019  4:00 PM Rajni Haywood PTA [facility]

## 2019-09-25 ENCOUNTER — HOSPITAL ENCOUNTER (OUTPATIENT)
Dept: PHYSICAL THERAPY | Age: 49
Discharge: HOME | End: 2019-09-25
Payer: COMMERCIAL

## 2019-09-25 PROCEDURE — 97016 VASOPNEUMATIC DEVICE THERAPY: CPT

## 2019-09-25 PROCEDURE — 97032 APPL MODALITY 1+ESTIM EA 15: CPT

## 2019-09-25 PROCEDURE — 97110 THERAPEUTIC EXERCISES: CPT

## 2019-09-25 NOTE — PROGRESS NOTES
PT DAILY TREATMENT NOTE 10-18    Patient Name: Ana Acevedo  Date:2019  : 1970  [x]  Patient  Verified  Payor: Amanda Cortes / Plan: [facility] [employer] / Product Type: Commerical /    In time 8:05  Out time: 8:59  Total Treatment Time (min): 47  Visit #: 10 of 30    Medicare/BCBS Only   Total Timed Codes (min):   1:1 Treatment Time:         Treatment Area: Pain in right knee [M25.561]    SUBJECTIVE  Pain Level (0-10 scale): 1-2  Any medication changes, allergies to medications, adverse drug reactions, diagnosis change, or new procedure performed?: [x] No    [] Yes (see summary sheet for update)  Subjective functional status/changes:   [] No changes reported  \"Still some pain. \"    OBJECTIVE    Modality rationale: decrease edema, decrease inflammation, decrease pain and increase tissue extensibility to improve the patients ability to perform ADL   Min Type Additional Details    [] Estim:  []Unatt       []IFC  []Premod                        []Other:  []w/ice   []w/heat  Position:  Location:   8 [x] Estim: [x]Att    []TENS instruct  []NMES                    [x]Other:LTO  []w/US   []w/ice   []w/heat  Position:long  sit  Location: righ t knee    []  Traction: [] Cervical       []Lumbar                       [] Prone          []Supine                       []Intermittent   []Continuous Lbs:  [] before manual  [] after manual    []  Ultrasound: []Continuous   [] Pulsed                           []1MHz   []3MHz W/cm2:  Location:    []  Iontophoresis with dexamethasone         Location: [] Take home patch   [] In clinic    []  Ice     []  heat  []  Ice massage  []  Laser   []  Anodyne Position:  Location:    []  Laser with stim  []  Other:  Position:  Location:   10 []  Vasopneumatic Device Pressure:       [x] lo [] med [] hi   Temperature: [] lo [] med [] hi   [x] Skin assessment post-treatment:  [x]intact []redness- no adverse reaction    []redness - adverse reaction:      min []Eval []Re-Eval       29 min Therapeutic Exercise:  [] See flow sheet :   Rationale:  to improve the patients ability to      min Therapeutic Activity:  []  See flow sheet :   Rationale:   to improve the patients ability to       min Neuromuscular Re-education:  []  See flow sheet :   Rationale:   to improve the patients ability to      min Manual Therapy:     Rationale: decrease pain, increase ROM, increase tissue extensibility and decrease edema  to perform ADL     min Gait Training:  ___ feet with ___ device on level surfaces with ___ level of assist   Rationale: With   [x] TE   [] TA   [] neuro   [] other: Patient Education: [x] Review HEP    [] Progressed/Changed HEP based on:   [] positioning   [] body mechanics   [] transfers   [] heat/ice application    [] other:      Other Objective/Functional Measures:      Pain Level (0-10 scale) post treatment: 1    ASSESSMENT/Changes in Function: ambulated  With antalgic  Gait  On TM. Fair response  To remaining  There ex. Patient will continue to benefit from skilled PT services to address functional mobility deficits, address ROM deficits, address strength deficits, analyze and address soft tissue restrictions and analyze and cue movement patterns to attain remaining goals. [x]  See Plan of Care  []  See progress note/recertification  []  See Discharge Summary         Progress towards goals / Updated goals:  Goals: to be achieved in 30 total treatments:  1. Patient will decrease pain during aggravating activities by 4 points on Verbal Analog Scale (Eval: 5/10) to increase participation on Activities of Daily Living such as, squatting, kneeling, lifting light to moderate objects as required by job.     CURRENT 2-3 9/17/19 9/20/19  2.  Patient will demonstrate increased strength by 1 grade on MMT in B LEs (Eval: 3+/5) to improve functional participation in such tasks as standing and sitting for greater than 30 min.   CURRENT ongoing 9/20/19          3. Patient will achieve predicted FOTO scores (59, eval 33) to demonstrate decreased functional impairment to facilitate improved participation in activities of daily living, improve overall quality of life CURRENT 43              4 patient will tolerate TM 1/4 mile with no significant increase pain to aid with increase tolerance to ADLs and activities at home              CURRENT . 16 miles in 6 minutes  , 7 minutes 9/20/19    PLAN  []  Upgrade activities as tolerated     []  Continue plan of care  []  Update interventions per flow sheet       []  Discharge due to:_  []  Other:_      Rajni Haywood PTA 9/25/2019  8:09 AM    Future Appointments   Date Time Provider Edith Jordan   9/30/2019  7:30 AM Mehdi Hernandez PTA [facility]   10/2/2019  7:30 AM Edvin Keene, PT [facility]   10/4/2019  4:00 PM Mehdi Hernandez PTA [facility]   10/9/2019  7:30 AM Rajni Haywood PTA [facility]   10/11/2019  4:00 PM Rajni Haywood PTA [facility]

## 2019-09-27 ENCOUNTER — APPOINTMENT (OUTPATIENT)
Dept: PHYSICAL THERAPY | Age: 49
End: 2019-09-27
Payer: COMMERCIAL

## 2019-09-30 ENCOUNTER — HOSPITAL ENCOUNTER (OUTPATIENT)
Dept: PHYSICAL THERAPY | Age: 49
Discharge: HOME | End: 2019-09-30
Payer: COMMERCIAL

## 2019-09-30 PROCEDURE — 97110 THERAPEUTIC EXERCISES: CPT

## 2019-09-30 PROCEDURE — 97016 VASOPNEUMATIC DEVICE THERAPY: CPT

## 2019-09-30 NOTE — PROGRESS NOTES
PT DAILY TREATMENT NOTE 10-18    Patient Name: Joan Clark  Date:2019  : 1970  [x]  Patient  Verified  Payor: Orlando Hart / Plan: [place] [employer]  [employer] [facility] / Product Type: Commerical /    In time: 7:30  Out time:8:14  Total Treatment Time (min): 44  Visit #: 11 of 30    Medicare/BCBS Only   Total Timed Codes (min):   1:1 Treatment Time:         Treatment Area: Pain in right knee [M25.561]    SUBJECTIVE  Pain Level (0-10 scale): 1  Any medication changes, allergies to medications, adverse drug reactions, diagnosis change, or new procedure performed?: [x] No    [] Yes (see summary sheet for update)  Subjective functional status/changes:   [] No changes reported  \"stiffness. \"    OBJECTIVE    Modality rationale: decrease edema, decrease inflammation, decrease pain and increase tissue extensibility to improve the patients ability to perform  ADL   Min Type Additional Details    [] Estim:  []Unatt       []IFC  []Premod                        []Other:  []w/ice   []w/heat  Position:  Location:    [] Estim: []Att    []TENS instruct  []NMES                    []Other:  []w/US   []w/ice   []w/heat  Position:  Location:    []  Traction: [] Cervical       []Lumbar                       [] Prone          []Supine                       []Intermittent   []Continuous Lbs:  [] before manual  [] after manual    []  Ultrasound: []Continuous   [] Pulsed                           []1MHz   []3MHz W/cm2:  Location:    []  Iontophoresis with dexamethasone         Location: [] Take home patch   [] In clinic    []  Ice     []  heat  []  Ice massage  []  Laser   []  Anodyne Position:  Location:    []  Laser with stim  []  Other:  Position:  Location:   10 [x]  Vasopneumatic Device Pressure:       [x] lo [] med [] hi   Temperature: [] lo [] med [] hi   [x] Skin assessment post-treatment:  [x]intact []redness- no adverse reaction    []redness - adverse reaction:      min []Eval                  []Re-Eval       34 min Therapeutic Exercise:  [x] See flow sheet :   Rationale: increase ROM and increase strength to improve the patients ability to perform ADL      min Therapeutic Activity:  []  See flow sheet :   Rationale:   to improve the patients ability to       min Neuromuscular Re-education:  []  See flow sheet :   Rationale:   to improve the patients ability to      min Manual Therapy:     Rationale: decrease pain, increase ROM, increase tissue extensibility and decrease edema  to perform ADL     min Gait Training:  ___ feet with ___ device on level surfaces with ___ level of assist   Rationale: With   [x] TE   [] TA   [] neuro   [] other: Patient Education: [x] Review HEP    [] Progressed/Changed HEP based on:   [] positioning   [] body mechanics   [] transfers   [] heat/ice application    [] other:      Other Objective/Functional Measures:  Increased  Time on TM  8 min    Pain Level (0-10 scale) post treatment: 0-1    ASSESSMENT/Changes in Function: Experienced  Pain at  Lateral aspect  Of  Knee  With quad  Sets. Patient will continue to benefit from skilled PT services to address functional mobility deficits, address ROM deficits, address strength deficits, analyze and address soft tissue restrictions and analyze and cue movement patterns to attain remaining goals. [x]  See Plan of Care  []  See progress note/recertification  []  See Discharge Summary         Progress towards goals / Updated goals:  Goals: to be achieved in 30 total treatments:  1. Patient will decrease pain during aggravating activities by 4 points on Verbal Analog Scale (Eval: 5/10) to increase participation on Activities of Daily Living such as, squatting, kneeling, lifting light to moderate objects as required by job.     CURRENT 2-3 9/17/19 9/20/19  2.  Patient will demonstrate increased strength by 1 grade on MMT in B LEs (Eval: 3+/5) to improve functional participation in such tasks as standing and sitting for greater than 30 min.   CURRENT ongoing 9/20/19          3. Patient will achieve predicted FOTO scores (59, eval 33) to demonstrate decreased functional impairment to facilitate improved participation in activities of daily living, improve overall quality of life CURRENT 43              4 patient will tolerate TM 1/4 mile with no significant increase pain to aid with increase tolerance to ADLs and activities at home              CURRENT . 16 miles in 6 minutes  , 7 minutes 9/20/19      8 min  9/30/19  PLAN  []  Upgrade activities as tolerated     [x]  Continue plan of care  []  Update interventions per flow sheet       []  Discharge due to:_  []  Other:_      Rajni Haywood PTA 9/30/2019  7:42 AM    Future Appointments   Date Time Provider Edith Jordan   10/2/2019  7:30 AM Anamaria Malhotra, PT [facility]   10/4/2019  4:00 PM Alfred Fox PTA [facility]   10/9/2019  7:30 AM Rajni Haywood PTA [facility]   10/11/2019  4:00 PM Rajni Haywood PTA [facility]

## 2019-10-02 ENCOUNTER — HOSPITAL ENCOUNTER (OUTPATIENT)
Dept: PHYSICAL THERAPY | Age: 49
Discharge: HOME | End: 2019-10-02
Payer: COMMERCIAL

## 2019-10-02 PROCEDURE — 97110 THERAPEUTIC EXERCISES: CPT

## 2019-10-02 PROCEDURE — 97016 VASOPNEUMATIC DEVICE THERAPY: CPT

## 2019-10-02 PROCEDURE — 97530 THERAPEUTIC ACTIVITIES: CPT

## 2019-10-02 PROCEDURE — 97112 NEUROMUSCULAR REEDUCATION: CPT

## 2019-10-02 NOTE — PROGRESS NOTES
PT DAILY TREATMENT NOTE 10-18    Patient Name: Yarely Loya  Date:10/2/2019  : 1970  [x]  Patient  Verified  Payor: Alexus Pierre / [facility]  [facility] / Product Type: Commerical /    In time:733  Out time:827  Total Treatment Time (min): 54  Visit #: 12 of 30    Medicare/BCBS Only   Total Timed Codes (min):   1:1 Treatment Time:         Treatment Area: Pain in right knee [M25.561]    SUBJECTIVE  Pain Level (0-10 scale): 2  Any medication changes, allergies to medications, adverse drug reactions, diagnosis change, or new procedure performed?: [x] No    [] Yes (see summary sheet for update)  Subjective functional status/changes:   [] No changes reported  \"It is really achy today. I was up a few times last night and even took some tylenol. \"    OBJECTIVE    Modality rationale: decrease pain and increase tissue extensibility to improve the patients ability to tolerate ADLs and activities   Min Type Additional Details    [] Estim:  []Unatt       []IFC  []Premod                        []Other:  []w/ice   []w/heat  Position:  Location:    [] Estim: []Att    []TENS instruct  []NMES                    []Other:  []w/US   []w/ice   []w/heat  Position:  Location:    []  Traction: [] Cervical       []Lumbar                       [] Prone          []Supine                       []Intermittent   []Continuous Lbs:  [] before manual  [] after manual    []  Ultrasound: []Continuous   [] Pulsed                           []1MHz   []3MHz W/cm2:  Location:    []  Iontophoresis with dexamethasone         Location: [] Take home patch   [] In clinic    []  Ice     []  heat  []  Ice massage  []  Laser   []  Anodyne Position:  Location:    []  Laser with stim  []  Other:  Position:  Location:   15 [x]  Vasopneumatic Device   Right knee Pressure:       [x] lo [] med [] hi   Temperature: [x] lo [] med [] hi   [] Skin assessment post-treatment:  []intact []redness- no adverse reaction    []redness - adverse reaction: min []Eval                  []Re-Eval       23 min Therapeutic Exercise:  [x] See flow sheet :   Rationale: increase ROM, increase strength and improve coordination to improve the patients ability to tolerate ADLs and activities    8 min Therapeutic Activity:  [x]  See flow sheet :   Rationale: increase ROM, increase strength and improve coordination  to improve the patients ability to tolerate ADLs and activities     8 min Neuromuscular Re-education:  [x]  See flow sheet :   Rationale: increase ROM, increase strength and improve coordination  to improve the patients ability to tolerate activities and ADLs      min Manual Therapy:     Rationale:  to      min Gait Training:  ___ feet with ___ device on level surfaces with ___ level of assist   Rationale: With   [x] TE   [x] TA   [x] neuro   [] other: Patient Education: [x] Review HEP    [x] Progressed/Changed HEP based on:   [] positioning   [] body mechanics   [] transfers   [] heat/ice application    [] other:      Other Objective/Functional Measures: VC exercises and technique     Pain Level (0-10 scale) post treatment: <2    ASSESSMENT/Changes in Function: tolerated well although notes increased achiness today and unsure why. Patient will continue to benefit from skilled PT services to modify and progress therapeutic interventions, address functional mobility deficits, address ROM deficits, address strength deficits, analyze and address soft tissue restrictions, analyze and cue movement patterns, analyze and modify body mechanics/ergonomics, assess and modify postural abnormalities and instruct in home and community integration to attain remaining goals. [x]  See Plan of Care  [x]  See progress note/recertification  []  See Discharge Summary         Progress towards goals / Updated goals:   Goals: to be achieved in 30 total treatments:  1.  Patient will decrease pain during aggravating activities by 4 points on Verbal Analog Scale (Eval: 5/10) to increase participation on Activities of Daily Living such as, squatting, kneeling, lifting light to moderate objects as required by job.     CURRENT 2-3 9/17/19 9/20/19 2 10/2/19  2. Patient will demonstrate increased strength by 1 grade on MMT in B LEs (Eval: 3+/5) to improve functional participation in such tasks as standing and sitting for greater than 30 min.   CURRENT ongoing 9/20/19   10/2/19          3. Patient will achieve predicted FOTO scores (59, eval 33) to demonstrate decreased functional impairment to facilitate improved participation in activities of daily living, improve overall quality of life CURRENT 43              4 patient will tolerate TM 1/4 mile with no significant increase pain to aid with increase tolerance to ADLs and activities at home              CURRENT . 16 miles in 6 minutes  , 7 minutes 9/20/19      8 min  9/30/19   9 minutes . 24 miles 10/2 /19     PLAN  [x]  Upgrade activities as tolerated     [x]  Continue plan of care  []  Update interventions per flow sheet       []  Discharge due to:_  []  Other:_      Clare High, PT 10/2/2019  7:37 AM    Future Appointments   Date Time Provider Edith Jordan   10/4/2019  4:00 PM Rajni Haywood PTA [facility]   10/9/2019  7:30 AM Rajni Haywood PTA [facility]   10/11/2019  4:00 PM [facility]

## 2019-10-04 ENCOUNTER — APPOINTMENT (OUTPATIENT)
Dept: PHYSICAL THERAPY | Age: 49
End: 2019-10-04
Payer: COMMERCIAL

## 2019-10-09 ENCOUNTER — HOSPITAL ENCOUNTER (OUTPATIENT)
Dept: PHYSICAL THERAPY | Age: 49
Discharge: HOME | End: 2019-10-09
Payer: COMMERCIAL

## 2019-10-09 PROCEDURE — 97110 THERAPEUTIC EXERCISES: CPT

## 2019-10-09 NOTE — PROGRESS NOTES
PT DAILY TREATMENT NOTE 10-18    Patient Name: Beti Whelan  Date:10/9/2019  : 1970  [x]  Patient  Verified  Payor: Violeta Stable / [facility] Product Type: Commerical /    In time: 7:31  Out time:8:20  Total Treatment Time (min): 45  Visit #: 13 of 30    Medicare/BCBS Only   Total Timed Codes (min):   1:1 Treatment Time:         Treatment Area: Pain in right knee [M25.561]    SUBJECTIVE  Pain Level (0-10 scale): 1  Any medication changes, allergies to medications, adverse drug reactions, diagnosis change, or new procedure performed?: [x] No    [] Yes (see summary sheet for update)  Subjective functional status/changes:   [] No changes reported  \" Im ok. \"    OBJECTIVE    Modality rationale: decrease edema, decrease inflammation, decrease pain and increase tissue extensibility to improve the patients ability to perform ADL   Min Type Additional Details    [] Estim:  []Unatt       []IFC  []Premod                        []Other:  []w/ice   []w/heat  Position:  Location:    [] Estim: []Att    []TENS instruct  []NMES                    []Other:  []w/US   []w/ice   []w/heat  Position:  Location:    []  Traction: [] Cervical       []Lumbar                       [] Prone          []Supine                       []Intermittent   []Continuous Lbs:  [] before manual  [] after manual    []  Ultrasound: []Continuous   [] Pulsed                           []1MHz   []3MHz W/cm2:  Location:    []  Iontophoresis with dexamethasone         Location: [] Take home patch   [] In clinic    []  Ice     []  heat  []  Ice massage  []  Laser   []  Anodyne Position:  Location:    []  Laser with stim  []  Other:  Position:  Location:   10 [x]  Vasopneumatic Device Pressure:       [x] lo [] med [] hi   Temperature: [] lo [] med [] hi   [x] Skin assessment post-treatment:  [x]intact []redness- no adverse reaction    []redness - adverse reaction:      min []Eval                  []Re-Eval       35 min Therapeutic Exercise:  [x] See flow sheet :   Rationale: increase ROM and increase strength to improve the patients ability to perform ADL      min Therapeutic Activity:  []  See flow sheet :   Rationale: increase ROM and increase strength  to improve the patients ability to perform ADL       min Neuromuscular Re-education:  []  See flow sheet :   Rationale:   to improve the patients ability to      min Manual Therapy:     Rationale: decrease pain, increase ROM, increase tissue extensibility and decrease edema  to perform ADL     min Gait Training:  ___ feet with ___ device on level surfaces with ___ level of assist   Rationale: With   [x] TE   [] TA   [] neuro   [] other: Patient Education: [x] Review HEP    [] Progressed/Changed HEP based on:   [] positioning   [] body mechanics   [] transfers   [] heat/ice application    [] other:      Other Objective/Functional Measures:      Pain Level (0-10 scale) post treatment: <1    ASSESSMENT/Changes in Function:  C/o  Tightness  Following  TM  But  At  End  Of  Session was resolved. Patient will continue to benefit from skilled PT services to address functional mobility deficits, address ROM deficits, address strength deficits, analyze and address soft tissue restrictions and analyze and cue movement patterns to attain remaining goals. [x]  See Plan of Care  []  See progress note/recertification  []  See Discharge Summary         Progress towards goals / Updated goals:   Goals: to be achieved in 30 total treatments:  1. Patient will decrease pain during aggravating activities by 4 points on Verbal Analog Scale (Eval: 5/10) to increase participation on Activities of Daily Living such as, squatting, kneeling, lifting light to moderate objects as required by job.     CURRENT 2-3 9/17/19 9/20/19 2 10/2/19  2.  Patient will demonstrate increased strength by 1 grade on MMT in B LEs (Eval: 3+/5) to improve functional participation in such tasks as standing and sitting for greater than 30 min.   CURRENT ongoing 9/20/19   10/2/19          3. Patient will achieve predicted FOTO scores (59, eval 33) to demonstrate decreased functional impairment to facilitate improved participation in activities of daily living, improve overall quality of life CURRENT 43              4 patient will tolerate TM 1/4 mile with no significant increase pain to aid with increase tolerance to ADLs and activities at home              CURRENT . 16 miles in 6 minutes  , 7 minutes 9/20/19      8 min  9/30/19   9 minutes . 24 miles 10/2 /19        PLAN  []  Upgrade activities as tolerated     []  Continue plan of care  []  Update interventions per flow sheet       []  Discharge due to:_  [x]  Other:_  REASSESS MMT NV    Rajni Haywood PTA 10/9/2019  7:38 AM    Future Appointments   Date Time Provider Edith Jordan   10/11/2019  4:00 PM Roshni Monroy PTA [facility]   10/15/2019  7:30 AM Rajni Haywood PTA [facility]   10/17/2019  8:00 AM Weston Winkler PT [facility]

## 2019-10-11 ENCOUNTER — HOSPITAL ENCOUNTER (OUTPATIENT)
Dept: PHYSICAL THERAPY | Age: 49
Discharge: HOME | End: 2019-10-11
Payer: COMMERCIAL

## 2019-10-11 PROCEDURE — 97110 THERAPEUTIC EXERCISES: CPT

## 2019-10-11 PROCEDURE — 97530 THERAPEUTIC ACTIVITIES: CPT

## 2019-10-11 NOTE — PROGRESS NOTES
PT DAILY TREATMENT NOTE 10-18    Patient Name: Beti Whelan  Date:10/11/2019  : 1970  [x]  Patient  Verified  Payor: Violeta Stable / [facility] [place] [employer] Product Type: Commerical /    In time:  3:55 Out time:4:50  Total Treatment Time (min): 43  Visit #:  14 of 30    Medicare/BCBS Only   Total Timed Codes (min):   1:1 Treatment Time:         Treatment Area: Pain in right knee [M25.561]    SUBJECTIVE  Pain Level (0-10 scale): 1  Any medication changes, allergies to medications, adverse drug reactions, diagnosis change, or new procedure performed?: [x] No    [] Yes (see summary sheet for update)  Subjective functional status/changes:   [] No changes reported  \"Feeling  Ok. \"    OBJECTIVE    Modality rationale: decrease edema, decrease inflammation, decrease pain and increase tissue extensibility to improve the patients ability to perform ADL    Min Type Additional Details    [] Estim:  []Unatt       []IFC  []Premod                        []Other:  []w/ice   []w/heat  Position:  Location:    [] Estim: []Att    []TENS instruct  []NMES                    []Other:  []w/US   []w/ice   []w/heat  Position:  Location:    []  Traction: [] Cervical       []Lumbar                       [] Prone          []Supine                       []Intermittent   []Continuous Lbs:  [] before manual  [] after manual    []  Ultrasound: []Continuous   [] Pulsed                           []1MHz   []3MHz W/cm2:  Location:    []  Iontophoresis with dexamethasone         Location: [] Take home patch   [] In clinic   10 [x]  Ice     []  heat  []  Ice massage  []  Laser   []  Anodyne Position:long  sit  Location:right  knee    []  Laser with stim  []  Other:  Position:  Location:    []  Vasopneumatic Device Pressure:       [] lo [] med [] hi   Temperature: [] lo [] med [] hi   [x] Skin assessment post-treatment:  [x]intact []redness- no adverse reaction    []redness - adverse reaction:      min []Eval                  []Re-Eval 23 min Therapeutic Exercise:  [x] See flow sheet :   Rationale: increase ROM and increase strength to improve the patients ability to perform ADL    10 min Therapeutic Activity:  [x]  See flow sheet :   Rationale:   to improve the patients ability to perform ADL       min Neuromuscular Re-education:  []  See flow sheet :   Rationale:   to improve the patients ability to      min Manual Therapy:     Rationale:  to      min Gait Training:  ___ feet with ___ device on level surfaces with ___ level of assist   Rationale: With   [x] TE   [] TA   [] neuro   [] other: Patient Education: [x] Review HEP    [] Progressed/Changed HEP based on:   [] positioning   [] body mechanics   [] transfers   [] heat/ice application    [] other:      Other Objective/Functional Measures:MMT knee F 4+   E  4/4+     Pain Level (0-10 scale) post treatment: 1    ASSESSMENT/Changes in Function: Improved in MMT since initial eval.  Responded  Fairly well to each there ex. Patient will continue to benefit from skilled PT services to address functional mobility deficits, address ROM deficits, address strength deficits, analyze and address soft tissue restrictions and analyze and modify body mechanics/ergonomics to attain remaining goals. [x]  See Plan of Care  []  See progress note/recertification  []  See Discharge Summary         Progress towards goals / Updated goals:   Goals: to be achieved in 30 total treatments:  1. Patient will decrease pain during aggravating activities by 4 points on Verbal Analog Scale (Eval: 5/10) to increase participation on Activities of Daily Living such as, squatting, kneeling, lifting light to moderate objects as required by job.     CURRENT 2-3 9/17/19 9/20/19 2 10/2/19  2.  Patient will demonstrate increased strength by 1 grade on MMT in B LEs (Eval: 3+/5) to improve functional participation in such tasks as standing and sitting for greater than 30 min.   CURRENT  MMT knee F 4+   E 4/4+           3. Patient will achieve predicted FOTO scores (59, eval 33) to demonstrate decreased functional impairment to facilitate improved participation in activities of daily living, improve overall quality of life CURRENT 43              4 patient will tolerate TM 1/4 mile with no significant increase pain to aid with increase tolerance to ADLs and activities at home              CURRENT . 16 miles in 6 minutes  , 7 minutes 9/20/19      8 min  9/30/19   9 minutes . 24 miles 10/2 /19      PLAN  []  Upgrade activities as tolerated     []  Continue plan of care  []  Update interventions per flow sheet       []  Discharge due to:_  []  Other:_      Rajni Haywood PTA 10/11/2019  4:02 PM    Future Appointments   Date Time Provider Edith Jordan   10/15/2019  7:30 AM Antonio Gandhi PTA [facility]   10/17/2019  8:00 AM Nancy العراقي PT [facility]

## 2019-10-15 ENCOUNTER — APPOINTMENT (OUTPATIENT)
Dept: PHYSICAL THERAPY | Age: 49
End: 2019-10-15
Payer: COMMERCIAL

## 2019-10-17 ENCOUNTER — APPOINTMENT (OUTPATIENT)
Dept: PHYSICAL THERAPY | Age: 49
End: 2019-10-17
Payer: COMMERCIAL

## 2019-10-21 ENCOUNTER — HOSPITAL ENCOUNTER (OUTPATIENT)
Dept: PHYSICAL THERAPY | Age: 49
Discharge: HOME | End: 2019-10-21
Payer: COMMERCIAL

## 2019-10-21 PROCEDURE — 97110 THERAPEUTIC EXERCISES: CPT

## 2019-10-21 PROCEDURE — 97016 VASOPNEUMATIC DEVICE THERAPY: CPT

## 2019-10-21 NOTE — PROGRESS NOTES
PT DAILY TREATMENT NOTE 10-18    Patient Name: Piotr Hay  Date:10/21/2019  : 1970  [x]  Patient  Verified  Payor: Adama Hagan / Plan: [place] [employer]  [email or web address] / Product Type: Commerical /    In time:7:33  Out time:8:22  Total Treatment Time (min): 52  Visit #: 15 of 30    Medicare/BCBS Only   Total Timed Codes (min):   1:1 Treatment Time:         Treatment Area: Pain in right knee [M25.561]    SUBJECTIVE  Pain Level (0-10 scale): 1  Any medication changes, allergies to medications, adverse drug reactions, diagnosis change, or new procedure performed?: [x] No    [] Yes (see summary sheet for update)  Subjective functional status/changes:   [] No changes reported  \"Saw  MD last  Week he  Said its  [employer]\"    OBJECTIVE    Modality rationale: decrease edema, decrease inflammation, decrease pain and increase tissue extensibility to improve the patients ability to perform ADL    Min Type Additional Details    [] Estim:  []Unatt       []IFC  []Premod                        []Other:  []w/ice   []w/heat  Position:  Location:    [] Estim: []Att    []TENS instruct  []NMES                    []Other:  []w/US   []w/ice   []w/heat  Position:  Location:    []  Traction: [] Cervical       []Lumbar                       [] Prone          []Supine                       []Intermittent   []Continuous Lbs:  [] before manual  [] after manual    []  Ultrasound: []Continuous   [] Pulsed                           []1MHz   []3MHz W/cm2:  Location:    []  Iontophoresis with dexamethasone         Location: [] Take home patch   [] In clinic    []  Ice     []  heat  []  Ice massage  []  Laser   []  Anodyne Position:  Location:    []  Laser with stim  []  Other:  Position:  Location:   10 []  Vasopneumatic Device Pressure:       [x] lo [] med [] hi   Temperature: [] lo [] med [] hi   [x] Skin assessment post-treatment:  [x]intact []redness- no adverse reaction    []redness - adverse reaction:      min []Eval []Re-Eval       39 min Therapeutic Exercise:  [x] See flow sheet :   Rationale: increase ROM and increase strength to improve the patients ability to perform ADL     min Therapeutic Activity:  []  See flow sheet :   Rationale:   to improve the patients ability to       min Neuromuscular Re-education:  []  See flow sheet :   Rationale:   to improve the patients ability to      min Manual Therapy:     Rationale:  to      min Gait Training:  ___ feet with ___ device on level surfaces with ___ level of assist   Rationale: With   [x] TE   [] TA   [] neuro   [] other: Patient Education: [x] Review HEP    [] Progressed/Changed HEP based on:   [] positioning   [] body mechanics   [] transfers   [] heat/ice application    [] other: REASSESS GOALS     Other Objective/Functional Measures: FOTO: 51   MMT (B) Knee F    4+    E 4+    Pain Level (0-10 scale) post treatment: 1    ASSESSMENT/Changes in Function: Mrs. Kory Booker reports 75% improvement. Pain level on average is 1-2. Pt currently ambulating . 23 miles with antalagic gait due  To tightness and  weakness at right LE. MMT and FOTO have improved since initial eval.    Patient will continue to benefit from skilled PT services to address functional mobility deficits, address ROM deficits, address strength deficits, analyze and address soft tissue restrictions and analyze and cue movement patterns to attain remaining goals. [x]  See Plan of Care  []  See progress note/recertification  []  See Discharge Summary         Progress towards goals / Updated goals:   Goals: to be achieved in 30 total treatments:  1. Patient will decrease pain during aggravating activities by 4 points on Verbal Analog Scale (Eval: 5/10) to increase participation on Activities of Daily Living such as, squatting, kneeling, lifting light to moderate objects as required by job.     CURRENT 2-3 9/17/19 9/20/19 2 10/2/19  2.  Patient will demonstrate increased strength by 1 grade on MMT in B LEs (Eval: 3+/5) to improve functional participation in such tasks as standing and sitting for greater than 30 min.   CURRENT  MMT knee F 4+   E  4/4+               3. Patient will achieve predicted FOTO scores (59, eval 33) to demonstrate decreased functional impairment to facilitate improved participation in activities of daily living, improve overall quality of life CURRENT 43              4 patient will tolerate TM 1/4 mile with no significant increase pain to aid with increase tolerance to ADLs and activities at home              CURRENT . 23 8 min 10/21/19       PLAN  []  Upgrade activities as tolerated     [x]  Continue plan of care  []  Update interventions per flow sheet       []  Discharge due to:_  [x]  Other:_  FAX MD COLT Schmidt PTA 10/21/2019  7:48 AM    Future Appointments   Date Time Provider Edith Jordan   10/24/2019  5:00 PM Rajni Haywood PTA [facility]

## 2019-10-21 NOTE — PROGRESS NOTES
[facility] [address] [id number] [facility], [address] [place] [address], [address] [place], [place] [address],[address]  [phone] fax    Physician Update  [x] Progress Note  [] Discharge Summary  Patient name: Maria Luisa Gonzales Start of Care: 2019   Referral source: [place]* : 1970   Medical/Treatment Diagnosis: Pain in right knee [M25.561]  Payor: Shimon Dry / Plan: [address] PT / Product Type: Commerical /  Onset Date::2019                  Prior Hospitalization: see medical history Provider#: [id number]   Medications: Verified on Patient Summary List    Comorbidities: Patient reports: asthma, back pain, BMI >30, headaches, prior surgery, stroke or TIA. Prior Level of Function:Patient reports she was walking 2.5 miles/day 5x/week and doing Weight Watchers, lost 30lbs prior to surgery.   The Plan of Care and following information is based on the information from the initial evaluation. Visits from Start of Care: 15    Missed Visits:     Status at Evaluation/Last Progress Note: see initial eval  Progress towards Goals: Mrs. Volodymyr Hayes reports 75% improvement. Pain level on average is 1-2. Pt currently ambulating . 23 miles with antalagic gait due to tightness and  weakness at right LE. FOTO: 51  and MMT (B) Knee F  4+ , E 4+. She has exercises for her HEP and reports compliance. She has seen her MD and reports she is having ITBand tightness. She has been shown some exercises for this. She demonstrates the potential to make further functional gains within a reasonable time frame. Thank you. MMT and FOTO have improved since initial eval.  Goals: to be achieved in  20 total sessions:  1. Pt will be able to ascend/descend steps/stairs with minimal to no pain. 2.  Improved FOTO to 75 to show pt is able to perform standing and walking greater than 30 minutes.       ASSESSMENT/RECOMMENDATIONS:  [x]Continue therapy per initial plan/protocol at a frequency of 2 x per week for  5 additional sessions   []Continue therapy with the following recommended changes:_____________________      _____________________________________________________________________  []Discontinue therapy progressing towards or have reached established goals  []Discontinue therapy due to lack of appreciable progress towards goals  []Discontinue therapy due to lack of attendance or compliance  []Await Physician's recommendations/decisions regarding therapy  []Other:________________________________________________________________    Thank you for this referral. Hakeem Viramontes PTA 10/21/2019 8:36 AM  NOTE TO PHYSICIAN:  [address]  TO [facility]: [phone]  If you are unable to process this request in 24 hours please contact our office: [phone]    ? I have read the above report and request that my patient continue as recommended. ? I have read the above report and request that my patient continue therapy with the following changes/special instructions:_____________________________________  ? I have read the above report and request that my patient be discharged from therapy.     [de-identified] Signature:____________Date:_________TIME:________    [employer], Date and Time must be completed for valid certification **

## 2019-10-24 ENCOUNTER — HOSPITAL ENCOUNTER (OUTPATIENT)
Dept: PHYSICAL THERAPY | Age: 49
Discharge: HOME | End: 2019-10-24
Payer: COMMERCIAL

## 2019-10-24 PROCEDURE — 97530 THERAPEUTIC ACTIVITIES: CPT | Performed by: PHYSICAL THERAPIST

## 2019-10-24 PROCEDURE — 97110 THERAPEUTIC EXERCISES: CPT | Performed by: PHYSICAL THERAPIST

## 2019-10-24 PROCEDURE — 97112 NEUROMUSCULAR REEDUCATION: CPT | Performed by: PHYSICAL THERAPIST

## 2019-10-24 PROCEDURE — 97016 VASOPNEUMATIC DEVICE THERAPY: CPT | Performed by: PHYSICAL THERAPIST

## 2019-10-24 NOTE — PROGRESS NOTES
PT DAILY TREATMENT NOTE 10-18    Patient Name: Gregg Fix  Date:10/24/2019  : 1970  [x]  Patient  Verified  Payor: Buzz Guo / Plan: [place] [employer] [facility] / Product Type: Commerical /    In time:5:11P  Out time:6:05P  Total Treatment Time (min): 54min  Visit #: 16 of 20    Treatment Area: Pain in right knee [M25.561]    SUBJECTIVE  Pain Level (0-10 scale): 1/10  Any medication changes, allergies to medications, adverse drug reactions, diagnosis change, or new procedure performed?: [x] No    [] Yes (see summary sheet for update)  Subjective functional status/changes:   [] No changes reported  Patient reports she feels good overall, but still having some difficulty with putting all her weight on the R knee. OBJECTIVE    Modality rationale: decrease edema and decrease inflammation to improve the patients ability to improve exercise-induced inflammation.    Min Type Additional Details    [] Estim:  []Unatt       []IFC  []Premod                        []Other:  []w/ice   []w/heat  Position:  Location:    [] Estim: []Att    []TENS instruct  []NMES                    []Other:  []w/US   []w/ice   []w/heat  Position:  Location:    []  Traction: [] Cervical       []Lumbar                       [] Prone          []Supine                       []Intermittent   []Continuous Lbs:  [] before manual  [] after manual    []  Ultrasound: []Continuous   [] Pulsed                           []1MHz   []3MHz W/cm2:  Location:    []  Iontophoresis with dexamethasone         Location: [] Take home patch   [] In clinic    []  Ice     []  heat  []  Ice massage  []  Laser   []  Anodyne Position:  Location:    []  Laser with stim  []  Other:  Position:  Location:   15 [x]  Vasopneumatic Device Pressure:       [x] lo [] med [] hi   Temperature: [x] lo [] med [] hi   [] Skin assessment post-treatment:  []intact []redness- no adverse reaction    []redness - adverse reaction:     10 min Therapeutic Exercise:  [x] See flow sheet :   Rationale: increase ROM and increase strength to improve the patients ability to A/ROM and decrease pain with movement. 13 min Therapeutic Activity:  [x]  See flow sheet :   Rationale: increase strength and improve coordination  to improve the patients ability to Tolerate basic ADLs and job-related tasks without pain. 16 min Neuromuscular Re-education:  [x]  See flow sheet :   Rationale: improve coordination, improve balance and increase proprioception  to improve the patients ability to perform activities with good form and proprioception with tactile and verbal cuing appropriately. With   [x] TE   [x] TA   [] neuro   [] other: Patient Education: [x] Review HEP    [x] Progressed/Changed HEP based on:   [x] positioning   [] body mechanics   [] transfers   [] heat/ice application    [] other:      Other Objective/Functional Measures: Active knee extension on R: 0 degs, L: 10 degs in prone     Pain Level (0-10 scale) post treatment: 1/10    ASSESSMENT/Changes in Function: Patient is progressing towards goals - encouraged her to work on extension in prone. Continues to need skilled services for education, form, and appropriate progression of exercises. Patient will continue to benefit from skilled PT services to modify and progress therapeutic interventions, address functional mobility deficits, address ROM deficits, address strength deficits, analyze and address soft tissue restrictions, analyze and cue movement patterns and analyze and modify body mechanics/ergonomics to attain remaining goals. [x]  See Plan of Care  []  See progress note/recertification  []  See Discharge Summary         Progress towards goals / Updated goals:   1. Pt will be able to ascend/descend steps/stairs with minimal to no pain.   CURRENT ongoing 10/28/19  2.  Improved FOTO to 75 to show pt is able to perform standing and walking greater than 30 minutes    PLAN  [x]  Upgrade activities as tolerated     [] Continue plan of care  []  Update interventions per flow sheet       []  Discharge due to:_  []  Other:_      Gregoria Colon, PT 10/24/2019  5:23 PM    No future appointments.

## 2019-10-28 ENCOUNTER — HOSPITAL ENCOUNTER (OUTPATIENT)
Dept: PHYSICAL THERAPY | Age: 49
Discharge: HOME | End: 2019-10-28
Payer: COMMERCIAL

## 2019-10-28 PROCEDURE — 97016 VASOPNEUMATIC DEVICE THERAPY: CPT

## 2019-10-28 PROCEDURE — 97530 THERAPEUTIC ACTIVITIES: CPT

## 2019-10-28 PROCEDURE — 97112 NEUROMUSCULAR REEDUCATION: CPT

## 2019-10-28 PROCEDURE — 97110 THERAPEUTIC EXERCISES: CPT

## 2019-10-28 NOTE — PROGRESS NOTES
PT DAILY TREATMENT NOTE 10-18    Patient Name: Isaac Orosco  Date:10/28/2019  : 1970  [x]  Patient  Verified  Payor: Bobby Carvajal / Plan: [place] [employer]  [employer] / Product Type: Commerical /    In time:521  Out time:610  Total Treatment Time (min): 49  Visit #: 17 of 20    Medicare/BCBS Only   Total Timed Codes (min):    1:1 Treatment Time:           Treatment Area: Pain in right knee [M25.561]    SUBJECTIVE  Pain Level (0-10 scale): 1  Any medication changes, allergies to medications, adverse drug reactions, diagnosis change, or new procedure performed?: [x] No    [] Yes (see summary sheet for update)  Subjective functional status/changes:   [] No changes reported  \"It is doing alright .  \"    OBJECTIVE    Modality rationale: decrease pain and increase tissue extensibility to improve the patients ability to tolerate ADLs and activities   Min Type Additional Details    [] Estim:  []Unatt       []IFC  []Premod                        []Other:  []w/ice   []w/heat  Position:  Location:    [] Estim: []Att    []TENS instruct  []NMES                    []Other:  []w/US   []w/ice   []w/heat  Position:  Location:    []  Traction: [] Cervical       []Lumbar                       [] Prone          []Supine                       []Intermittent   []Continuous Lbs:  [] before manual  [] after manual    []  Ultrasound: []Continuous   [] Pulsed                           []1MHz   []3MHz W/cm2:  Location:    []  Iontophoresis with dexamethasone         Location: [] Take home patch   [] In clinic    []  Ice     []  heat  []  Ice massage  []  Laser   []  Anodyne Position:  Location:    []  Laser with stim  []  Other:  Position:  Location:   10 [x]  Vasopneumatic Device     Right knee Pressure:       [x] lo [] med [] hi   Temperature: [x] lo [] med [] hi   [] Skin assessment post-treatment:  []intact []redness- no adverse reaction    []redness - adverse reaction:       min []Eval                  []Re-Eval       23 min Therapeutic Exercise:  [x] See flow sheet :   Rationale: increase ROM, increase strength, improve coordination, improve balance and increase proprioception to improve the patients ability to tolerate ADLs and activities    8 min Therapeutic Activity:  [x]  See flow sheet :   Rationale: increase ROM, increase strength and improve coordination  to improve the patients ability to tolerate ADLs and activities     8 min Neuromuscular Re-education:  [x]  See flow sheet :   Rationale: increase ROM, increase strength, improve coordination and improve balance  to improve the patients ability to tolerate ADLs and activities     min Manual Therapy:     Rationale:  to      min Gait Training:  ___ feet with ___ device on level surfaces with ___ level of assist   Rationale: With   [x] TE   [x] TA   [] neuro   [] other: Patient Education: [x] Review HEP    [x] Progressed/Changed HEP based on:   [] positioning   [] body mechanics   [] transfers   [] heat/ice application    [] other:      Other Objective/Functional Measures: VC exercises and tech     Pain Level (0-10 scale) post treatment: 1    ASSESSMENT/Changes in Function: tolerated well. Patient will continue to benefit from skilled PT services to modify and progress therapeutic interventions, address functional mobility deficits, address ROM deficits, address strength deficits, analyze and address soft tissue restrictions, analyze and cue movement patterns, analyze and modify body mechanics/ergonomics, assess and modify postural abnormalities, address imbalance/dizziness and instruct in home and community integration to attain remaining goals. [x]  See Plan of Care  [x]  See progress note/recertification  []  See Discharge Summary         Progress towards goals / Updated goals:   1. Pt will be able to ascend/descend steps/stairs with minimal to no pain. CURRENT ongoing 10/28/19  2.   Improved FOTO to 75 to show pt is able to perform standing and walking greater than 30 minutes    PLAN  [x]  Upgrade activities as tolerated     [x]  Continue plan of care  []  Update interventions per flow sheet       []  Discharge due to:_  []  Other:_      Belvie Nageotte, PT 10/28/2019  5:47 PM    Future Appointments   Date Time Provider Edith Jordan   11/8/2019  8:00 AM Ann Olivo PTA [facility]   11/11/2019 12:00 PM Naldo Ruth [facility]   11/20/2019  7:30 AM Rajni Haywood PTA [facility]

## 2019-10-29 ENCOUNTER — APPOINTMENT (OUTPATIENT)
Dept: PHYSICAL THERAPY | Age: 49
End: 2019-10-29
Payer: COMMERCIAL

## 2019-11-06 ENCOUNTER — APPOINTMENT (OUTPATIENT)
Dept: PHYSICAL THERAPY | Age: 49
End: 2019-11-06
Payer: COMMERCIAL

## 2019-11-08 ENCOUNTER — HOSPITAL ENCOUNTER (OUTPATIENT)
Dept: PHYSICAL THERAPY | Age: 49
Discharge: HOME | End: 2019-11-08
Payer: COMMERCIAL

## 2019-11-08 PROCEDURE — 97110 THERAPEUTIC EXERCISES: CPT

## 2019-11-08 PROCEDURE — 97530 THERAPEUTIC ACTIVITIES: CPT

## 2019-11-08 NOTE — PROGRESS NOTES
PT DAILY TREATMENT NOTE 10-18    Patient Name: Abhinav Given  Date:2019  : 1970  [x]  Patient  Verified  Payor: Brittnee Schrader / Plan: [email or web address]  [employer] / Product Type: Commerical /    In time: 8:00 Out time:8:51  Total Treatment Time (min): 51  Visit #: 18 of 20    Medicare/BCBS Only   Total Timed Codes (min):   1:1 Treatment Time:         Treatment Area: Pain in right knee [M25.561]    SUBJECTIVE  Pain Level (0-10 scale): 1  Any medication changes, allergies to medications, adverse drug reactions, diagnosis change, or new procedure performed?: [x] No    [] Yes (see summary sheet for update)  Subjective functional status/changes:   [] No changes reported  \"Just  Pain when I try to get  Up after  Sitting for  ~ 10-15  Min. \"    OBJECTIVE    Modality rationale: decrease edema, decrease inflammation, decrease pain and increase tissue extensibility to improve the patients ability to perform ADL    Min Type Additional Details    [] Estim:  []Unatt       []IFC  []Premod                        []Other:  []w/ice   []w/heat  Position:  Location:    [] Estim: []Att    []TENS instruct  []NMES                    []Other:  []w/US   []w/ice   []w/heat  Position:  Location:    []  Traction: [] Cervical       []Lumbar                       [] Prone          []Supine                       []Intermittent   []Continuous Lbs:  [] before manual  [] after manual    []  Ultrasound: []Continuous   [] Pulsed                           []1MHz   []3MHz W/cm2:  Location:    []  Iontophoresis with dexamethasone         Location: [] Take home patch   [] In clinic    []  Ice     []  heat  []  Ice massage  []  Laser   []  Anodyne Position:  Location:    []  Laser with stim  []  Other:  Position:  Location:   10 [x]  Vasopneumatic Device Pressure:       [x] lo [] med [] hi   Temperature: [] lo [] med [] hi   [x] Skin assessment post-treatment:  [x]intact []redness- no adverse reaction    []redness - adverse reaction:      min []Eval                  []Re-Eval       28 min Therapeutic Exercise:  [x] See flow sheet :   Rationale: increase ROM and increase strength to improve the patients ability to perform ADL     13 min Therapeutic Activity:  [x]  See flow sheet :   Rationale: increase ROM and increase strength  to improve the patients ability to perform ADL       min Neuromuscular Re-education:  []  See flow sheet :   Rationale:   to improve the patients ability to      min Manual Therapy:     Rationale:  to      min Gait Training:  ___ feet with ___ device on level surfaces with ___ level of assist   Rationale: With   [x] TE   [] TA   [] neuro   [] other: Patient Education: [x] Review HEP    [] Progressed/Changed HEP based on:   [] positioning   [] body mechanics   [] transfers   [] heat/ice application    [] other:      Other Objective/Functional Measures:      Pain Level (0-10 scale) post treatment: 1    ASSESSMENT/Changes in Function: Completed  Each there ex  Fairly well. Continued with residual pain. Patient will continue to benefit from skilled PT services to address functional mobility deficits, address ROM deficits, address strength deficits, analyze and address soft tissue restrictions and analyze and cue movement patterns to attain remaining goals. [x]  See Plan of Care  []  See progress note/recertification  []  See Discharge Summary         Progress towards goals / Updated goals:  1. Pt will be able to ascend/descend steps/stairs with minimal to no pain.   CURRENT ongoing 10/28/19  2.  Improved FOTO to 75 to show pt is able to perform standing and walking greater than 30 minutes       PLAN  []  Upgrade activities as tolerated     [x]  Continue plan of care  []  Update interventions per flow sheet       []  Discharge due to:_  []  Other:_      Rajni Haywood, DAYNE 11/8/2019  8:02 AM    Future Appointments   Date Time Provider Edith Jordan   11/11/2019 12:00 PM Kee Mae [facility]   11/20/2019 7:30 AM Rajni Haywood PTA [facility]

## 2019-11-11 ENCOUNTER — HOSPITAL ENCOUNTER (OUTPATIENT)
Dept: PHYSICAL THERAPY | Age: 49
Discharge: HOME | End: 2019-11-11
Payer: COMMERCIAL

## 2019-11-11 PROCEDURE — 97530 THERAPEUTIC ACTIVITIES: CPT

## 2019-11-11 PROCEDURE — 97016 VASOPNEUMATIC DEVICE THERAPY: CPT

## 2019-11-11 PROCEDURE — 97112 NEUROMUSCULAR REEDUCATION: CPT

## 2019-11-11 PROCEDURE — 97110 THERAPEUTIC EXERCISES: CPT

## 2019-11-11 NOTE — PROGRESS NOTES
PT DAILY TREATMENT NOTE 10-18    Patient Name: Beti Whelan  Date:2019  : 1970  [x]  Patient  Verified  Payor: Violeta Clark / [facility] [place] [employer]  [facility] Product Type: Commer[facility] /    In time: 12:02  Out time: 1:05  Total Treatment Time (min): 63  Visit #: 23 of 20    Treatment Area: Pain in right knee [M25.561]    SUBJECTIVE  Pain Level (0-10 scale): 2/10  Any medication changes, allergies to medications, adverse drug reactions, diagnosis change, or new procedure performed?: [x] No    [] Yes (see summary sheet for update)  Subjective functional status/changes:   [] No changes reported  Patient stated she did yard work yesterday so she is more sore today. Patient also mentioned she continues to have pain at lateral knee with sit to stand transfers. OBJECTIVE    Modality rationale: decrease pain to improve the patients ability to perform ADls.     Min Type Additional Details    [] Estim:  []Unatt       []IFC  []Premod                        []Other:  []w/ice   []w/heat  Position:  Location:    [] Estim: []Att    []TENS instruct  []NMES                    []Other:  []w/US   []w/ice   []w/heat  Position:  Location:    []  Traction: [] Cervical       []Lumbar                       [] Prone          []Supine                       []Intermittent   []Continuous Lbs:  [] before manual  [] after manual    []  Ultrasound: []Continuous   [] Pulsed                           []1MHz   []3MHz W/cm2:  Location:    []  Iontophoresis with dexamethasone         Location: [] Take home patch   [] In clinic    []  Ice     []  heat  []  Ice massage  []  Laser   []  Anodyne Position:  Location:    []  Laser with stim  []  Other:  Position:  Location:   10 [x]  Vasopneumatic Device Pressure:       [x] lo [] med [] hi   Temperature: [] lo [x] med [] hi   [] Skin assessment post-treatment:  []intact []redness- no adverse reaction    []redness - adverse reaction:     33 min Therapeutic Exercise:  [x] See flow sheet : Rationale: increase ROM and increase strength to improve the patients ability to perform ADLs. 8 min Therapeutic Activity:  [x]  See flow sheet : sit to stand transfers, focused on first seated hip hinging while maintaining neutral spine and abdominal brace, and then added in placing pressure through heels for glut activation and buttock clearance. Then had patient progress to a full standing position with emphasis at glut sets at end range. Patient reported reduced pain at lateral right knee, but still had discomfort. Rationale: increase strength, improve coordination and increase proprioception  to improve the patients ability to transfers safety and with increased ease. 8 min Neuromuscular Re-education:  [x]  See flow sheet : Balance acts. Rhomberg and MSR stance (B) foam surface. Gait with cues to focus on heel strike and arms swing to assist with normalization of gait. Rationale: increase strength, improve coordination, improve balance and increase proprioception  to improve the patients ability to ambulate safely on varying terrain. 4 min Manual Therapy:  STM to distal right ITB    Rationale: decrease pain, increase ROM and increase tissue extensibility to increase ease with ADls. With   [] TE   [] TA   [] neuro   [] other: Patient Education: [x] Review HEP    [] Progressed/Changed HEP based on:   [] positioning   [] body mechanics   [] transfers   [] heat/ice application    [] other:      Other Objective/Functional Measures:    TTP and tightness noted at distal ITB. Pain Level (0-10 scale) post treatment: 2/10     ASSESSMENT/Changes in Function: Therapist observed patient during ambulation and noted that patient has a tendency to land at met head of right foot. Cued patient to emphasize heel strike and patient reported decreased pain and improved normalization of gait.  Therapist cued patient on sit to stand transfers to hip hinge and focus on glut activation during the transfer. Patient reported a slight decrease in pain when pushing through heels. Patient reported that the manual felt good, but did not notice any carry over or decreased pain with transfers following the manual.     Patient will continue to benefit from skilled PT services to modify and progress therapeutic interventions, address functional mobility deficits, address ROM deficits, address strength deficits, analyze and address soft tissue restrictions, analyze and cue movement patterns, assess and modify postural abnormalities and address imbalance/dizziness to attain remaining goals. []  See Plan of Care  []  See progress note/recertification  []  See Discharge Summary         Progress towards goals / Updated goals:  1. Pt will be able to ascend/descend steps/stairs with minimal to no pain.   CURRENT ongoing 10/28/19  2.  Improved FOTO to 75 to show pt is able to perform standing and walking greater than 30 minutes     PLAN  []  Upgrade activities as tolerated     [x]  Continue plan of care  []  Update interventions per flow sheet       []  Discharge due to:_  []  Other:_      Jarret Ball PT 11/11/2019  12:56 PM    Future Appointments   Date Time Provider Edith Jordan   11/20/2019  7:30 AM Rajni Haywood PTA [facility]

## 2019-11-20 ENCOUNTER — HOSPITAL ENCOUNTER (OUTPATIENT)
Dept: PHYSICAL THERAPY | Age: 49
Discharge: HOME | End: 2019-11-20
Payer: COMMERCIAL

## 2019-11-20 PROCEDURE — 97530 THERAPEUTIC ACTIVITIES: CPT

## 2019-11-20 NOTE — PROGRESS NOTES
PT DAILY TREATMENT NOTE 10-18    Patient Name: Beti Whelan  Date:2019  : 1970  [x]  Patient  Verified  Payor: Violeta Stable / [facility] [employer] Product Type: Commerical /    In time:7:36  Out time:7:50  Total Treatment Time (min): 20  Visit #: 20 of 20    Medicare/BCBS Only   Total Timed Codes (min):   1:1 Treatment Time:         Treatment Area: Pain in right knee [M25.561]    SUBJECTIVE  Pain Level (0-10 scale): 1  Any medication changes, allergies to medications, adverse drug reactions, diagnosis change, or new procedure performed?: [x] No    [] Yes (see summary sheet for update)  Subjective functional status/changes:   [] No changes reported  \"I had a  massage which she massage my ITband that helped. \"    OBJECTIVE    Modality rationale: decrease edema, decrease inflammation, decrease pain and increase tissue extensibility to improve the patients ability to perform ADL   Min Type Additional Details    [] Estim:  []Unatt       []IFC  []Premod                        []Other:  []w/ice   []w/heat  Position:  Location:    [] Estim: []Att    []TENS instruct  []NMES                    []Other:  []w/US   []w/ice   []w/heat  Position:  Location:    []  Traction: [] Cervical       []Lumbar                       [] Prone          []Supine                       []Intermittent   []Continuous Lbs:  [] before manual  [] after manual    []  Ultrasound: []Continuous   [] Pulsed                           []1MHz   []3MHz W/cm2:  Location:    []  Iontophoresis with dexamethasone         Location: [] Take home patch   [] In clinic    []  Ice     []  heat  []  Ice massage  []  Laser   []  Anodyne Position:  Location:    []  Laser with stim  []  Other:  Position:  Location:    []  Vasopneumatic Device Pressure:       [] lo [] med [] hi   Temperature: [] lo [] med [] hi   [x] Skin assessment post-treatment:  [x]intact []redness- no adverse reaction    []redness - adverse reaction:      min []Eval []Re-Eval        min Therapeutic Exercise:  [x] See flow sheet :   Rationale: increase ROM and increase strength to improve the patients ability to perform ADL     20 min Therapeutic Activity:  [x]  See flow sheet :   Rationale: increase ROM and increase strength  to improve the patients ability to perform ADL      min Neuromuscular Re-education:  []  See flow sheet :   Rationale:   to improve the patients ability to      min Manual Therapy:     Rationale: decrease pain, increase ROM, increase tissue extensibility and decrease edema  to perform ADL      min Gait Training:  ___ feet with ___ device on level surfaces with ___ level of assist   Rationale: With   [x] TE   [] TA   [] neuro   [] other: Patient Education: [x] Review HEP    [] Progressed/Changed HEP based on:   [] positioning   [] body mechanics   [] transfers   [] heat/ice application    [] other: REASSESS GOALS/DC PROCEDURE     Other Objective/Functional Measures: FOTO 63    Pain Level (0-10 scale) post treatment: 1    ASSESSMENT/Changes in Function: Mrs. Thompson  Reports  85% improvement. Pain level on average is 2/10. FOTO has improved since  Initial eval.    Patient will continue to benefit from skilled PT services to address functional mobility deficits, address ROM deficits, address strength deficits, analyze and address soft tissue restrictions and analyze and cue movement patterns to attain remaining goals. []  See Plan of Care  []  See progress note/recertification  [x]  See Discharge Summary         Progress towards goals / Updated goals:  Progress towards goals / Updated goals:  1. Pt will be able to ascend/descend steps/stairs with minimal to no pain.   CURRENT 1/10 11/20/19  2.  Improved FOTO to 75 to show pt is able to perform standing and walking greater than 30 minutes   63  11/20/19       PLAN  []  Upgrade activities as tolerated     []  Continue plan of care  []  Update interventions per flow sheet       [] Discharge due to:_  [x]  Other:_  FAX DC NOTE     [facility], PTA 11/20/2019  7:37 AM    No future appointments.

## 2019-11-20 NOTE — PROGRESS NOTES
Physical Therapy Discharge Instructions      [facility] [address], [address] [place] [address], [address] [place], [place] [address] [address] [phone] fax    Patient: Beulah Merlin  : 1970      Continue Home Exercise Program 2 times per day for  weeks, then decrease to  times per week      Continue with    [x] Ice  as needed  times per day     [] Heat           Follow up with MD:     [x] Upon completion of therapy     [] As needed      Recommendations:     [x]   Return to activity with home program    []   Return to activity with the following modifications:       []Post Rehab Program    []Join Independent aquatic program     []Return to/join local gym        Additional Comments:         Kaya Merino, DAYNE 2019 7:49 AM

## 2019-11-20 NOTE — PROGRESS NOTES
[facility] [address]      [address] [facility], [address] [place] [address], [address] [place], [place] [address] [address]  [phone] fax    Discharge Summary    Patient name: Hitesh Jordan     Start of Care: 2019  Referral source: Merced Bernard*    : 1970  Medical/Treatment Diagnosis: Pain in right knee [M25.561]  Payor: Chauncey Fonseca / Plan: [address] PT / Product Type: Commerical /        Onset Date:2019  Prior Hospitalization: see medical history   Provider#: [id number]  Comorbidities: Patient reports: asthma, back pain, BMI >30, headaches, prior surgery, stroke or TIA. Prior Level of Function:Patient reports she was walking 2.5 miles/day 5x/week and doing Weight Watchers, lost 30lbs prior to surgery.   Medications: Verified on Patient Summary List    Visits from Start of Care: 20    Missed Visits: 0   Reporting Period : 10/24 to       Summary of Care:Mrs. Thompson  Reports  85% improvement. Pain level on average is 2/10. FOTO has improved since  Initial eval.    Goal:1. Pt will be able to ascend/descend steps/stairs with minimal to no pain. CURRENT 1/10 11/20/19  Status at last note/certification:1-2  Status at discharge: met    Keyla Collins to 75 to show pt is able to perform standing and walking greater than 30 minutes        Status at last note/certification:63  Status at discharge: not met    ASSESSMENT/RECOMMENDATIONS:  [x]Discontinue therapy progressing towards or have reached established goals  []Discontinue therapy due to lack of appreciable progress towards goals  []Discontinue therapy due to lack of attendance or compliance  []Other:     Thank you for this referral.     Rajni Haywood, PTA 2019 8:56 AM

## 2020-07-09 ENCOUNTER — HOSPITAL ENCOUNTER (OUTPATIENT)
Dept: LAB | Age: 50
Discharge: HOME | End: 2020-07-09

## 2020-07-09 LAB — SENTARA SPECIMEN COL,SENBCF: NORMAL

## 2020-07-09 PROCEDURE — 99001 SPECIMEN HANDLING PT-LAB: CPT

## 2021-01-19 ENCOUNTER — HOSPITAL ENCOUNTER (OUTPATIENT)
Dept: LAB | Age: 51
Discharge: HOME | End: 2021-01-19

## 2021-01-19 LAB — SENTARA SPECIMEN COL,SENBCF: NORMAL

## 2021-01-19 PROCEDURE — 99001 SPECIMEN HANDLING PT-LAB: CPT
